# Patient Record
Sex: FEMALE | Race: WHITE | NOT HISPANIC OR LATINO | Employment: UNEMPLOYED | URBAN - NONMETROPOLITAN AREA
[De-identification: names, ages, dates, MRNs, and addresses within clinical notes are randomized per-mention and may not be internally consistent; named-entity substitution may affect disease eponyms.]

---

## 2017-06-21 ENCOUNTER — OFFICE VISIT (OUTPATIENT)
Dept: OPHTHALMOLOGY | Facility: CLINIC | Age: 57
End: 2017-06-21

## 2017-06-21 DIAGNOSIS — H52.03 HYPERMETROPIA, BILATERAL: ICD-10-CM

## 2017-06-21 DIAGNOSIS — H40.003 BORDERLINE GLAUCOMA, BILATERAL: Primary | ICD-10-CM

## 2017-06-21 PROCEDURE — 99214 OFFICE O/P EST MOD 30 MIN: CPT | Performed by: OPHTHALMOLOGY

## 2017-06-21 PROCEDURE — 76514 ECHO EXAM OF EYE THICKNESS: CPT | Performed by: OPHTHALMOLOGY

## 2017-06-21 PROCEDURE — 92133 CPTRZD OPH DX IMG PST SGM ON: CPT | Performed by: OPHTHALMOLOGY

## 2017-06-21 RX ORDER — TOPIRAMATE 100 MG/1
100 TABLET, FILM COATED ORAL
COMMUNITY
Start: 2016-11-04 | End: 2017-11-05

## 2017-06-21 RX ORDER — NAPROXEN 500 MG/1
500 TABLET ORAL
COMMUNITY
Start: 2016-11-07 | End: 2017-11-08

## 2017-06-21 RX ORDER — ALBUTEROL SULFATE 90 UG/1
2 AEROSOL, METERED RESPIRATORY (INHALATION) EVERY 6 HOURS
COMMUNITY
Start: 2016-11-07 | End: 2017-11-08

## 2017-06-21 NOTE — PROGRESS NOTES
Subjective   Carlota Rodriguez is a 56 y.o. female.   Chief Complaint   Patient presents with   • Eye Exam     HPI     Eye Exam   Laterality: both eyes   Quality: blurry vision   Severity: moderate           Comments   BV OU past 2 months, gradual; diff driving at night; FH glaucoma       Last edited by Ryan Howell MD on 6/21/2017 10:05 AM. (History)          Review of Systems    Objective   Base Eye Exam     Visual Acuity (Snellen - Linear)      Right Left   Dist cc 20/30 -2 20/30 -2   Near cc J3 J7       Correction:  Glasses      Tonometry (Applanation, 9:32 AM)      Right Left   Pressure 17 17         Pachymetry (6/21/2017)      Right Left   Thickness 578 555         Pupils      Pupils   Right PERRL   Left PERRL         Visual Fields      Left Right   Result Full Full         Extraocular Movement      Right Left   Result Full Full         Dilation     Both eyes:  2.5% Matias Synephrine, 1.0% Mydriacyl @ 9:40 AM            Slit Lamp and Fundus Exam     External Exam      Right Left    External Normal Normal      Slit Lamp Exam      Right Left    Lids/Lashes Normal Normal    Conjunctiva/Sclera White and quiet White and quiet    Cornea Clear Clear    Anterior Chamber Deep and quiet Deep and quiet    Iris Round and reactive Round and reactive    Lens Clear Clear    Vitreous Normal Normal      Fundus Exam      Right Left    Disc Tilted cup 0.15 ST,IT Normal 0.2 ST, 0.15 IT    Macula Normal Normal    Vessels Normal Normal    Periphery Normal Normal            Refraction     Wearing Rx      Sphere Cylinder Axis Add   Right +0.75 +0.50 135 +2.25   Left +0.25 +0.75 047 +2.25         Manifest Refraction      Sphere   Right ni   Left ni               OCT Disc:   OD- normal  OS- normal    Assessment/Plan   Diagnoses and all orders for this visit:    Borderline glaucoma, bilateral    Hypermetropia, bilateral      Plan:       Return in about 2 weeks (around 7/5/2017) for Visual Field 24-2, Refraction.

## 2018-03-28 ENCOUNTER — OFFICE VISIT (OUTPATIENT)
Dept: INTERNAL MEDICINE | Facility: CLINIC | Age: 58
End: 2018-03-28

## 2018-03-28 VITALS
BODY MASS INDEX: 35.33 KG/M2 | DIASTOLIC BLOOD PRESSURE: 80 MMHG | WEIGHT: 199.4 LBS | HEIGHT: 63 IN | SYSTOLIC BLOOD PRESSURE: 135 MMHG

## 2018-03-28 DIAGNOSIS — Z11.59 NEED FOR HEPATITIS C SCREENING TEST: ICD-10-CM

## 2018-03-28 DIAGNOSIS — G89.29 CHRONIC PAIN OF RIGHT ANKLE: ICD-10-CM

## 2018-03-28 DIAGNOSIS — M25.571 CHRONIC PAIN OF RIGHT ANKLE: ICD-10-CM

## 2018-03-28 DIAGNOSIS — E66.9 OBESITY, CLASS II, BMI 35-39.9: ICD-10-CM

## 2018-03-28 DIAGNOSIS — Z00.00 PHYSICAL EXAM, ROUTINE: Primary | ICD-10-CM

## 2018-03-28 PROCEDURE — 99203 OFFICE O/P NEW LOW 30 MIN: CPT | Performed by: INTERNAL MEDICINE

## 2018-03-28 RX ORDER — MELOXICAM 15 MG/1
15 TABLET ORAL DAILY
Qty: 30 TABLET | Refills: 2 | Status: SHIPPED | OUTPATIENT
Start: 2018-03-28 | End: 2018-07-15 | Stop reason: SINTOL

## 2018-03-28 NOTE — PROGRESS NOTES
Subjective   Carlota Rodriguez is a 57 y.o. female with PMH of obesity, mild intermittent asthma and chronic ankle pain who comes to establish care.    Patient is concerned about gradual weight gain. Has been on weight loss medications in the past with variable success.  She could not tolerate Contrave due to the side effects. Topamax did not help much. Took Phentermine, lost weight and then regained it back.      States that thyroid was boderline at some point and would like to have it rechecked. Also strong family history of DM.  Has never taken any thyroid replacement therapy.  No cold intolerance or fatigue but has been having problems with hair thinning  and dry skin.    Compalins of chronic right ankle pain. She fractured her ankle in 2002, had ankle surgery  In [place] and has chronic ankle pain since then, getting worse which impacts her quality of life. Her ankle has been swelling at times.  She was told that she needs ankle replacement and would like to explore this option again.          Past Medical History:   Diagnosis Date   • Allergic    • Anxiety    • Arthritis    • Asthma    • Depression    • Headache    • Heart murmur    • Hordeolum     KLAUDIA   • Low back pain    • Obesity    • Pneumonia    • Urinary tract infection    • Visual impairment      Past Surgical History:   Procedure Laterality Date   • BACK SURGERY     • COLONOSCOPY     • FOOT FRACTURE SURGERY      bilateral ankle surgery   • HYSTERECTOMY         Social History   Substance Use Topics   • Smoking status: Never Smoker   • Smokeless tobacco: Never Used   • Alcohol use No     Family History   Problem Relation Age of Onset   • Cataracts Mother    • Diabetes Mother    • Miscarriages / Stillbirths Mother    • Cataracts Father    • Stroke Father    • Anxiety disorder Sister    • Depression Sister    • Thyroid disease Sister    • Diabetes Brother    • Early death Brother    • Stroke Brother    • Cataracts Brother    • Cancer Maternal Aunt    •  Diabetes Maternal Aunt    • Diabetes Maternal Uncle    • Diabetes Paternal Aunt    • Diabetes Paternal Uncle    • Depression Paternal Grandmother    • Alcohol abuse Neg Hx      No Known Allergies  No current outpatient prescriptions on file prior to visit.     No current facility-administered medications on file prior to visit.      Review of Systems   Constitutional: Negative for activity change, chills and fever.   HENT: Negative for facial swelling and nosebleeds.    Respiratory: Negative for chest tightness and shortness of breath.    Cardiovascular: Negative for chest pain, palpitations and leg swelling.   Gastrointestinal: Negative for abdominal pain, constipation and diarrhea.   Endocrine: Negative for cold intolerance, heat intolerance, polydipsia and polyuria.   Genitourinary: Negative for flank pain and frequency.   Musculoskeletal:        Positive for right ankle pain    Neurological: Negative for dizziness, facial asymmetry and light-headedness.   Hematological: Negative for adenopathy. Does not bruise/bleed easily.   Psychiatric/Behavioral: Negative for sleep disturbance. The patient is not nervous/anxious.        Objective   Physical Exam   Constitutional: She is oriented to person, place, and time. She appears well-developed and well-nourished.   HENT:   Head: Normocephalic and atraumatic.   Nose: Nose normal.   Mouth/Throat: Oropharynx is clear and moist.   Eyes: Conjunctivae are normal. No scleral icterus.   Neck: Neck supple. No JVD present. No tracheal deviation present. No thyromegaly present.   Cardiovascular: Normal rate and regular rhythm.    Pulmonary/Chest: Effort normal and breath sounds normal.   Abdominal: Soft. Bowel sounds are normal. There is no tenderness. No hernia.   Musculoskeletal:        Right ankle: She exhibits decreased range of motion.   Right ankle - hypertrophic changes   Lymphadenopathy:     She has no cervical adenopathy.   Neurological: She is alert and oriented to  person, place, and time. She has normal reflexes. No cranial nerve deficit.   Skin: Skin is warm and dry. No rash noted.   Psychiatric: She has a normal mood and affect. Her behavior is normal. Judgment and thought content normal.   Nursing note and vitals reviewed.          There is no problem list on file for this patient.              Assessment/Plan   Carlota was seen today for establish care.    Diagnoses and all orders for this visit:    Physical exam, routine  -     CBC (No Diff)  -     Comprehensive metabolic panel  -     TSH  -     Lipid Panel  -     Hemoglobin A1c    Obesity, Class II, BMI 35-39.9  -     CBC (No Diff)  -     Comprehensive metabolic panel  -     TSH  -     Lipid Panel  -     Hemoglobin A1c    Chronic pain of right ankle  -     Ambulatory Referral to Orthopedic Surgery    Need for hepatitis C screening test  -     Hepatitis C Antibody    Other orders  -     Lorcaserin HCl (BELVIQ) 10 MG tablet; Take 1 tablet by mouth 2 (Two) Times a Day.  -     meloxicam (MOBIC) 15 MG tablet; Take 1 tablet by mouth Daily.         Plan    1. Patient is counseled on calories restricted diet.      Advised to increase physical activity as tolerated.     Trial of Lorcaserin      Medication side effects reviewed in detail.      Advised about habit nature of prescribed medication.      Jacques obtained and consistent.    2. Mobic 15 mg daily for ankle pain.      Referral to  in [facility] in [place].      Follow up in 1 month.        This document has been electronically signed by Janeth Mirza MD on March 28, 2018 2:31 PM

## 2018-03-28 NOTE — PATIENT INSTRUCTIONS
Calorie Counting for Weight Loss  Calories are units of energy. Your body needs a certain amount of calories from food to keep you going throughout the day. When you eat more calories than your body needs, your body stores the extra calories as fat. When you eat fewer calories than your body needs, your body burns fat to get the energy it needs.  Calorie counting means keeping track of how many calories you eat and drink each day. Calorie counting can be helpful if you need to lose weight. If you make sure to eat fewer calories than your body needs, you should lose weight. Ask your health care provider what a healthy weight is for you.  For calorie counting to work, you will need to eat the right number of calories in a day in order to lose a healthy amount of weight per week. A dietitian can help you determine how many calories you need in a day and will give you suggestions on how to reach your calorie goal.  · A healthy amount of weight to lose per week is usually 1-2 lb (0.5-0.9 kg). This usually means that your daily calorie intake should be reduced by 500-750 calories.  · Eating 1,200 - 1,500 calories per day can help most women lose weight.  · Eating 1,500 - 1,800 calories per day can help most men lose weight.  What is my plan?  My goal is to have __________ calories per day.  If I have this many calories per day, I should lose around __________ pounds per week.  What do I need to know about calorie counting?  In order to meet your daily calorie goal, you will need to:  · Find out how many calories are in each food you would like to eat. Try to do this before you eat.  · Decide how much of the food you plan to eat.  · Write down what you ate and how many calories it had. Doing this is called keeping a food log.  To successfully lose weight, it is important to balance calorie counting with a healthy lifestyle that includes regular activity. Aim for 150 minutes of moderate exercise (such as walking) or 75  minutes of vigorous exercise (such as running) each week.  Where do I find calorie information?     The number of calories in a food can be found on a Nutrition Facts label. If a food does not have a Nutrition Facts label, try to look up the calories online or ask your dietitian for help.  Remember that calories are listed per serving. If you choose to have more than one serving of a food, you will have to multiply the calories per serving by the amount of servings you plan to eat. For example, the label on a package of bread might say that a serving size is 1 slice and that there are 90 calories in a serving. If you eat 1 slice, you will have eaten 90 calories. If you eat 2 slices, you will have eaten 180 calories.  How do I keep a food log?  Immediately after each meal, record the following information in your food log:  · What you ate. Don't forget to include toppings, sauces, and other extras on the food.  · How much you ate. This can be measured in cups, ounces, or number of items.  · How many calories each food and drink had.  · The total number of calories in the meal.  Keep your food log near you, such as in a small notebook in your pocket, or use a mobile brett or website. Some programs will calculate calories for you and show you how many calories you have left for the day to meet your goal.  What are some calorie counting tips?  · Use your calories on foods and drinks that will fill you up and not leave you hungry:  ¨ Some examples of foods that fill you up are nuts and nut butters, vegetables, lean proteins, and high-fiber foods like whole grains. High-fiber foods are foods with more than 5 g fiber per serving.  ¨ Drinks such as sodas, specialty coffee drinks, alcohol, and juices have a lot of calories, yet do not fill you up.  · Eat nutritious foods and avoid empty calories. Empty calories are calories you get from foods or beverages that do not have many vitamins or protein, such as candy, sweets, and  "soda. It is better to have a nutritious high-calorie food (such as an avocado) than a food with few nutrients (such as a bag of chips).  · Know how many calories are in the foods you eat most often. This will help you calculate calorie counts faster.  · Pay attention to calories in drinks. Low-calorie drinks include water and unsweetened drinks.  · Pay attention to nutrition labels for \"low fat\" or \"fat free\" foods. These foods sometimes have the same amount of calories or more calories than the full fat versions. They also often have added sugar, starch, or salt, to make up for flavor that was removed with the fat.  · Find a way of tracking calories that works for you. Get creative. Try different apps or programs if writing down calories does not work for you.  What are some portion control tips?  · Know how many calories are in a serving. This will help you know how many servings of a certain food you can have.  · Use a measuring cup to measure serving sizes. You could also try weighing out portions on a kitchen scale. With time, you will be able to estimate serving sizes for some foods.  · Take some time to put servings of different foods on your favorite plates, bowls, and cups so you know what a serving looks like.  · Try not to eat straight from a bag or box. Doing this can lead to overeating. Put the amount you would like to eat in a cup or on a plate to make sure you are eating the right portion.  · Use smaller plates, glasses, and bowls to prevent overeating.  · Try not to multitask (for example, watch TV or use your computer) while eating. If it is time to eat, sit down at a table and enjoy your food. This will help you to know when you are full. It will also help you to be aware of what you are eating and how much you are eating.  What are tips for following this plan?  Reading food labels   · Check the calorie count compared to the serving size. The serving size may be smaller than what you are used to " eating.  · Check the source of the calories. Make sure the food you are eating is high in vitamins and protein and low in saturated and trans fats.  Shopping   · Read nutrition labels while you shop. This will help you make healthy decisions before you decide to purchase your food.  · Make a grocery list and stick to it.  Cooking   · Try to cook your favorite foods in a healthier way. For example, try baking instead of frying.  · Use low-fat dairy products.  Meal planning   · Use more fruits and vegetables. Half of your plate should be fruits and vegetables.  · Include lean proteins like poultry and fish.  How do I count calories when eating out?  · Ask for smaller portion sizes.  · Consider sharing an entree and sides instead of getting your own entree.  · If you get your own entree, eat only half. Ask for a box at the beginning of your meal and put the rest of your entree in it so you are not tempted to eat it.  · If calories are listed on the menu, choose the lower calorie options.  · Choose dishes that include vegetables, fruits, whole grains, low-fat dairy products, and lean protein.  · Choose items that are boiled, broiled, grilled, or steamed. Stay away from items that are buttered, battered, fried, or served with cream sauce. Items labeled “crispy” are usually fried, unless stated otherwise.  · Choose water, low-fat milk, unsweetened iced tea, or other drinks without added sugar. If you want an alcoholic beverage, choose a lower calorie option such as a glass of wine or light beer.  · Ask for dressings, sauces, and syrups on the side. These are usually high in calories, so you should limit the amount you eat.  · If you want a salad, choose a garden salad and ask for grilled meats. Avoid extra toppings like garcia, cheese, or fried items. Ask for the dressing on the side, or ask for olive oil and vinegar or lemon to use as dressing.  · Estimate how many servings of a food you are given. For example, a serving  of cooked rice is ½ cup or about the size of half a baseball. Knowing serving sizes will help you be aware of how much food you are eating at restaurants. The list below tells you how big or small some common portion sizes are based on everyday objects:  ¨ 1 oz--4 stacked dice.  ¨ 3 oz--1 deck of cards.  ¨ 1 tsp--1 die.  ¨ 1 Tbsp--½ a ping-pong ball.  ¨ 2 Tbsp--1 ping-pong ball.  ¨ ½ cup--½ baseball.  ¨ 1 cup--1 baseball.  Summary  · Calorie counting means keeping track of how many calories you eat and drink each day. If you eat fewer calories than your body needs, you should lose weight.  · A healthy amount of weight to lose per week is usually 1-2 lb (0.5-0.9 kg). This usually means reducing your daily calorie intake by 500-750 calories.  · The number of calories in a food can be found on a Nutrition Facts label. If a food does not have a Nutrition Facts label, try to look up the calories online or ask your dietitian for help.  · Use your calories on foods and drinks that will fill you up, and not on foods and drinks that will leave you hungry.  · Use smaller plates, glasses, and bowls to prevent overeating.  This information is not intended to replace advice given to you by your health care provider. Make sure you discuss any questions you have with your health care provider.  Document Released: 12/18/2006 Document Revised: 11/17/2017 Document Reviewed: 11/17/2017  [employer] Patient Education © 2017 [employer]  Exercising to Lose Weight  Exercising can help you to lose weight. In order to lose weight through exercise, you need to do vigorous-intensity exercise. You can tell that you are exercising with vigorous intensity if you are breathing very hard and fast and cannot hold a conversation while exercising.  Moderate-intensity exercise helps to maintain your current weight. You can tell that you are exercising at a moderate level if you have a higher heart rate and faster breathing, but you are  still able to hold a conversation.  How often should I exercise?  Choose an activity that you enjoy and set realistic goals. Your health care provider can help you to make an activity plan that works for you. Exercise regularly as directed by your health care provider. This may include:  · Doing resistance training twice each week, such as:  ¨ Push-ups.  ¨ Sit-ups.  ¨ Lifting weights.  ¨ Using resistance bands.  · Doing a given intensity of exercise for a given amount of time. Choose from these options:  ¨ 150 minutes of moderate-intensity exercise every week.  ¨ 75 minutes of vigorous-intensity exercise every week.  ¨ A mix of moderate-intensity and vigorous-intensity exercise every week.  Children, pregnant women, people who are out of shape, people who are overweight, and older adults may need to consult a health care provider for individual recommendations. If you have any sort of medical condition, be sure to consult your health care provider before starting a new exercise program.  What are some activities that can help me to lose weight?  · Walking at a rate of at least 4.5 miles an hour.  · Jogging or running at a rate of 5 miles per hour.  · Biking at a rate of at least 10 miles per hour.  · Lap swimming.  · Roller-skating or in-line skating.  · Cross-country skiing.  · Vigorous competitive sports, such as football, basketball, and soccer.  · Jumping rope.  · Aerobic dancing.  How can I be more active in my day-to-day activities?  · Use the stairs instead of the elevator.  · Take a walk during your lunch break.  · If you drive, park your car farther away from work or school.  · If you take public transportation, get off one stop early and walk the rest of the way.  · Make all of your phone calls while standing up and walking around.  · Get up, stretch, and walk around every 30 minutes throughout the day.  What guidelines should I follow while exercising?  · Do not exercise so much that you hurt yourself,  feel dizzy, or get very short of breath.  · Consult your health care provider prior to starting a new exercise program.  · Wear comfortable clothes and shoes with good support.  · Drink plenty of water while you exercise to prevent dehydration or heat stroke. Body water is lost during exercise and must be replaced.  · Work out until you breathe faster and your heart beats faster.  This information is not intended to replace advice given to you by your health care provider. Make sure you discuss any questions you have with your health care provider.  Document Released: 01/20/2012 Document Revised: 05/25/2017 Document Reviewed: 05/21/2015  [employer] Patient Education © 2017 [employer]

## 2018-03-29 ENCOUNTER — APPOINTMENT (OUTPATIENT)
Dept: LAB | Facility: HOSPITAL | Age: 58
End: 2018-03-29

## 2018-03-29 LAB
ALBUMIN SERPL-MCNC: 4.4 G/DL (ref 3.4–4.8)
ALBUMIN/GLOB SERPL: 1.1 G/DL (ref 1.1–1.8)
ALP SERPL-CCNC: 207 U/L (ref 38–126)
ALT SERPL W P-5'-P-CCNC: 32 U/L (ref 9–52)
ANION GAP SERPL CALCULATED.3IONS-SCNC: 15 MMOL/L (ref 5–15)
ARTICHOKE IGE QN: 67 MG/DL (ref 1–129)
AST SERPL-CCNC: 33 U/L (ref 14–36)
BILIRUB SERPL-MCNC: 0.5 MG/DL (ref 0.2–1.3)
BUN BLD-MCNC: 15 MG/DL (ref 7–21)
BUN/CREAT SERPL: 18.5 (ref 7–25)
CALCIUM SPEC-SCNC: 9.7 MG/DL (ref 8.4–10.2)
CHLORIDE SERPL-SCNC: 103 MMOL/L (ref 95–110)
CHOLEST SERPL-MCNC: 145 MG/DL (ref 0–199)
CO2 SERPL-SCNC: 25 MMOL/L (ref 22–31)
CREAT BLD-MCNC: 0.81 MG/DL (ref 0.5–1)
DEPRECATED RDW RBC AUTO: 39.4 FL (ref 36.4–46.3)
ERYTHROCYTE [DISTWIDTH] IN BLOOD BY AUTOMATED COUNT: 12 % (ref 11.5–14.5)
GFR SERPL CREATININE-BSD FRML MDRD: 73 ML/MIN/1.73 (ref 51–120)
GLOBULIN UR ELPH-MCNC: 3.9 GM/DL (ref 2.3–3.5)
GLUCOSE BLD-MCNC: 100 MG/DL (ref 60–100)
HBA1C MFR BLD: 5.6 % (ref 4–5.6)
HCT VFR BLD AUTO: 37.6 % (ref 35–45)
HDLC SERPL-MCNC: 49 MG/DL (ref 60–200)
HGB BLD-MCNC: 13 G/DL (ref 12–15.5)
LDLC/HDLC SERPL: 1.56 {RATIO} (ref 0–3.22)
MCH RBC QN AUTO: 31.3 PG (ref 26.5–34)
MCHC RBC AUTO-ENTMCNC: 34.6 G/DL (ref 31.4–36)
MCV RBC AUTO: 90.6 FL (ref 80–98)
PLATELET # BLD AUTO: 438 10*3/MM3 (ref 150–450)
PMV BLD AUTO: 10.2 FL (ref 8–12)
POTASSIUM BLD-SCNC: 4.7 MMOL/L (ref 3.5–5.1)
PROT SERPL-MCNC: 8.3 G/DL (ref 6.3–8.6)
RBC # BLD AUTO: 4.15 10*6/MM3 (ref 3.77–5.16)
SODIUM BLD-SCNC: 143 MMOL/L (ref 137–145)
TRIGL SERPL-MCNC: 98 MG/DL (ref 20–199)
TSH SERPL DL<=0.05 MIU/L-ACNC: 3.59 MIU/ML (ref 0.46–4.68)
WBC NRBC COR # BLD: 5.09 10*3/MM3 (ref 3.2–9.8)

## 2018-03-29 PROCEDURE — 86803 HEPATITIS C AB TEST: CPT | Performed by: INTERNAL MEDICINE

## 2018-03-29 PROCEDURE — 84443 ASSAY THYROID STIM HORMONE: CPT | Performed by: INTERNAL MEDICINE

## 2018-03-29 PROCEDURE — 85027 COMPLETE CBC AUTOMATED: CPT | Performed by: INTERNAL MEDICINE

## 2018-03-29 PROCEDURE — 80061 LIPID PANEL: CPT | Performed by: INTERNAL MEDICINE

## 2018-03-29 PROCEDURE — 36415 COLL VENOUS BLD VENIPUNCTURE: CPT | Performed by: INTERNAL MEDICINE

## 2018-03-29 PROCEDURE — 83036 HEMOGLOBIN GLYCOSYLATED A1C: CPT | Performed by: INTERNAL MEDICINE

## 2018-03-29 PROCEDURE — 80053 COMPREHEN METABOLIC PANEL: CPT | Performed by: INTERNAL MEDICINE

## 2018-03-30 ENCOUNTER — TELEPHONE (OUTPATIENT)
Dept: INTERNAL MEDICINE | Facility: CLINIC | Age: 58
End: 2018-03-30

## 2018-03-30 LAB — HCV AB SER DONR QL: NEGATIVE

## 2018-03-30 NOTE — TELEPHONE ENCOUNTER
SPOKE WITH PT LET HER KNOW LAB SHOW NOT DIABETIC ,CHOLESTEROL WAS OK ,THYROID FUNCTION OK ,ONE LIVER ENZYMES WAS ELEVATED BUT DR JENKINS SAID WILL RECHEC AGAIN WHEN SHE COMES BACK

## 2018-05-14 ENCOUNTER — OFFICE VISIT (OUTPATIENT)
Dept: INTERNAL MEDICINE | Facility: CLINIC | Age: 58
End: 2018-05-14

## 2018-05-14 VITALS
HEIGHT: 63 IN | BODY MASS INDEX: 33.65 KG/M2 | DIASTOLIC BLOOD PRESSURE: 80 MMHG | SYSTOLIC BLOOD PRESSURE: 130 MMHG | WEIGHT: 189.9 LBS

## 2018-05-14 DIAGNOSIS — R74.8 ABNORMAL LIVER ENZYMES: Primary | ICD-10-CM

## 2018-05-14 DIAGNOSIS — E66.9 OBESITY (BMI 30.0-34.9): ICD-10-CM

## 2018-05-14 DIAGNOSIS — K76.0 NAFLD (NONALCOHOLIC FATTY LIVER DISEASE): ICD-10-CM

## 2018-05-14 PROCEDURE — 99214 OFFICE O/P EST MOD 30 MIN: CPT | Performed by: INTERNAL MEDICINE

## 2018-05-18 ENCOUNTER — APPOINTMENT (OUTPATIENT)
Dept: ULTRASOUND IMAGING | Facility: HOSPITAL | Age: 58
End: 2018-05-18
Attending: INTERNAL MEDICINE

## 2018-05-29 ENCOUNTER — APPOINTMENT (OUTPATIENT)
Dept: LAB | Facility: HOSPITAL | Age: 58
End: 2018-05-29

## 2018-05-29 ENCOUNTER — HOSPITAL ENCOUNTER (OUTPATIENT)
Dept: ULTRASOUND IMAGING | Facility: HOSPITAL | Age: 58
Discharge: HOME | End: 2018-05-29
Attending: INTERNAL MEDICINE | Admitting: INTERNAL MEDICINE

## 2018-05-29 DIAGNOSIS — R79.89 ABNORMAL LIVER FUNCTION TEST: Primary | ICD-10-CM

## 2018-05-29 LAB
ALBUMIN SERPL-MCNC: 4 G/DL (ref 3.4–4.8)
ALP SERPL-CCNC: 169 U/L (ref 38–126)
ALT SERPL W P-5'-P-CCNC: 19 U/L (ref 9–52)
AST SERPL-CCNC: 24 U/L (ref 14–36)
BILIRUB CONJ SERPL-MCNC: 0 MG/DL (ref 0–0.3)
BILIRUB INDIRECT SERPL-MCNC: 0.2 MG/DL (ref 0–1.1)
BILIRUB SERPL-MCNC: 0.5 MG/DL (ref 0.2–1.3)
PROT SERPL-MCNC: 8 G/DL (ref 6.3–8.6)

## 2018-05-29 PROCEDURE — 80076 HEPATIC FUNCTION PANEL: CPT | Performed by: INTERNAL MEDICINE

## 2018-05-29 PROCEDURE — 76705 ECHO EXAM OF ABDOMEN: CPT

## 2018-05-29 PROCEDURE — 36415 COLL VENOUS BLD VENIPUNCTURE: CPT | Performed by: INTERNAL MEDICINE

## 2018-05-30 ENCOUNTER — TELEPHONE (OUTPATIENT)
Dept: INTERNAL MEDICINE | Facility: CLINIC | Age: 58
End: 2018-05-30

## 2018-05-30 NOTE — TELEPHONE ENCOUNTER
Spoke wit pt let her know lab shows alk phosphate still elevated but better .ultrasound shows some evidence of liver disease DR JENKINS  Has ordered adittional liver test she needs to go to lab

## 2018-05-31 ENCOUNTER — LAB (OUTPATIENT)
Dept: LAB | Facility: HOSPITAL | Age: 58
End: 2018-05-31

## 2018-05-31 ENCOUNTER — TELEPHONE (OUTPATIENT)
Dept: FAMILY MEDICINE CLINIC | Facility: CLINIC | Age: 58
End: 2018-05-31

## 2018-05-31 DIAGNOSIS — R79.89 ABNORMAL LIVER FUNCTION TEST: ICD-10-CM

## 2018-05-31 LAB — FERRITIN SERPL-MCNC: 121 NG/ML (ref 11.1–264)

## 2018-05-31 PROCEDURE — 82247 BILIRUBIN TOTAL: CPT

## 2018-05-31 PROCEDURE — 86704 HEP B CORE ANTIBODY TOTAL: CPT

## 2018-05-31 PROCEDURE — 83883 ASSAY NEPHELOMETRY NOT SPEC: CPT

## 2018-05-31 PROCEDURE — 36415 COLL VENOUS BLD VENIPUNCTURE: CPT

## 2018-05-31 PROCEDURE — 83010 ASSAY OF HAPTOGLOBIN QUANT: CPT

## 2018-05-31 PROCEDURE — 86706 HEP B SURFACE ANTIBODY: CPT

## 2018-05-31 PROCEDURE — 82947 ASSAY GLUCOSE BLOOD QUANT: CPT

## 2018-05-31 PROCEDURE — 84478 ASSAY OF TRIGLYCERIDES: CPT

## 2018-05-31 PROCEDURE — 83516 IMMUNOASSAY NONANTIBODY: CPT

## 2018-05-31 PROCEDURE — 82728 ASSAY OF FERRITIN: CPT

## 2018-05-31 PROCEDURE — 82172 ASSAY OF APOLIPOPROTEIN: CPT

## 2018-05-31 PROCEDURE — 82465 ASSAY BLD/SERUM CHOLESTEROL: CPT

## 2018-05-31 PROCEDURE — 87340 HEPATITIS B SURFACE AG IA: CPT

## 2018-05-31 PROCEDURE — 82103 ALPHA-1-ANTITRYPSIN TOTAL: CPT

## 2018-05-31 PROCEDURE — 84460 ALANINE AMINO (ALT) (SGPT): CPT

## 2018-05-31 PROCEDURE — 82977 ASSAY OF GGT: CPT

## 2018-05-31 PROCEDURE — 84450 TRANSFERASE (AST) (SGOT): CPT

## 2018-05-31 PROCEDURE — 82390 ASSAY OF CERULOPLASMIN: CPT

## 2018-05-31 NOTE — TELEPHONE ENCOUNTER
Pr Dr. Mirza, Ms. Rodriguez has been called with her recent US of the Liver additional results & recommendations.  Continue her current medications and follow-up as planned or sooner if any problems.      ----- Message from Janeth Mirza MD sent at 5/29/2018  5:59 PM CDT -----  See previous note  Also I forgot to mention kidney stone, not causing any blockage  ----- Message -----  From: [facility] Results [place]  Sent: 5/29/2018   1:31 PM  To: Janeth Mirza MD

## 2018-06-01 LAB
A1AT SERPL-MCNC: 167 MG/DL (ref 90–200)
ACTIN IGG SERPL-ACNC: 17 UNITS (ref 0–19)
CERULOPLASMIN SERPL-MCNC: 32.4 MG/DL (ref 19–39)
DEPRECATED MITOCHONDRIA M2 IGG SER-ACNC: <20 UNITS (ref 0–20)
HBV CORE AB SER DONR QL IA: NEGATIVE
HBV SURFACE AB SER QL: <5 (ref 0–4.99)
HBV SURFACE AB SER RIA-ACNC: ABNORMAL
HBV SURFACE AG SERPL QL IA: NEGATIVE

## 2018-06-02 LAB
A2 MACROGLOB SERPL-MCNC: 144 MG/DL (ref 110–276)
ALT SERPL W P-5'-P-CCNC: 9 IU/L (ref 0–40)
APO A-I SERPL-MCNC: 137 MG/DL (ref 116–209)
AST SERPL W P-5'-P-CCNC: 18 IU/L (ref 0–40)
BILIRUB SERPL-MCNC: 0.3 MG/DL (ref 0–1.2)
CHOLEST SERPL-MCNC: 143 MG/DL (ref 100–199)
FIBROSIS SCORING:: ABNORMAL
FIBROSIS STAGE SERPL QL: ABNORMAL
GGT SERPL-CCNC: 29 IU/L (ref 0–60)
GLUCOSE SERPL-MCNC: 98 MG/DL (ref 65–99)
HAPTOGLOB SERPL-MCNC: 327 MG/DL (ref 34–200)
INTERPRETATIONS: (REFERENCE): ABNORMAL
LABORATORY COMMENT REPORT: ABNORMAL
LIMITATIONS: (REFERENCE): ABNORMAL
LIVER FIBR SCORE SERPL CALC.FIBROSURE: 0.06 (ref 0–0.21)
NASH GRADE (REFERENCE): ABNORMAL
NASH SCORE (REFERENCE): 0.5
NASH SCORING (REFERENCE): ABNORMAL
STEATOSIS GRADE (REFERENCE): ABNORMAL
STEATOSIS GRADING (REFERENCE): ABNORMAL
STEATOSIS SCORE (REFERENCE): 0.59 (ref 0–0.3)
TRIGL SERPL-MCNC: 137 MG/DL (ref 0–149)
WEIGHT: (REFERENCE): 189 LBS

## 2018-06-05 ENCOUNTER — TELEPHONE (OUTPATIENT)
Dept: FAMILY MEDICINE CLINIC | Facility: CLINIC | Age: 58
End: 2018-06-05

## 2018-06-05 NOTE — PROGRESS NOTES
Pr Dr. Mirza, Ms. Rodriguez has been called with her recent lab results & recommendations.  Continue her current medications and follow-up as planned or sooner if any problems.

## 2018-06-05 NOTE — TELEPHONE ENCOUNTER
Pr Dr. Mirza, Ms. Rodriguez has been called with her recent lab results & recommendations.  Continue her current medications and follow-up as planned or sooner if any problems.      ----- Message from Janeth Mirza MD sent at 6/1/2018  5:13 PM CDT -----  Immune profile negative, also no hemochromatosis   [facility] is not back.  ----- Message -----  From: Lab, Background User  Sent: 5/31/2018  11:16 AM  To: Janeth Mirza MD

## 2018-07-13 ENCOUNTER — TELEPHONE (OUTPATIENT)
Dept: INTERNAL MEDICINE | Facility: CLINIC | Age: 58
End: 2018-07-13

## 2018-07-13 ENCOUNTER — OFFICE VISIT (OUTPATIENT)
Dept: INTERNAL MEDICINE | Facility: CLINIC | Age: 58
End: 2018-07-13

## 2018-07-13 ENCOUNTER — APPOINTMENT (OUTPATIENT)
Dept: LAB | Facility: HOSPITAL | Age: 58
End: 2018-07-13

## 2018-07-13 VITALS
DIASTOLIC BLOOD PRESSURE: 80 MMHG | BODY MASS INDEX: 32.39 KG/M2 | WEIGHT: 182.8 LBS | HEIGHT: 63 IN | SYSTOLIC BLOOD PRESSURE: 130 MMHG

## 2018-07-13 DIAGNOSIS — R07.89 CHEST PAIN, ATYPICAL: ICD-10-CM

## 2018-07-13 DIAGNOSIS — R06.02 SOB (SHORTNESS OF BREATH): Primary | ICD-10-CM

## 2018-07-13 DIAGNOSIS — E66.09 CLASS 1 OBESITY DUE TO EXCESS CALORIES WITH BODY MASS INDEX (BMI) OF 32.0 TO 32.9 IN ADULT, UNSPECIFIED WHETHER SERIOUS COMORBIDITY PRESENT: ICD-10-CM

## 2018-07-13 DIAGNOSIS — L98.9 SKIN LESION OF SCALP: ICD-10-CM

## 2018-07-13 DIAGNOSIS — Z79.899 HIGH RISK MEDICATION USE: ICD-10-CM

## 2018-07-13 LAB
ALBUMIN SERPL-MCNC: 4.4 G/DL (ref 3.4–4.8)
ALBUMIN/GLOB SERPL: 1 G/DL (ref 1.1–1.8)
ALP SERPL-CCNC: 164 U/L (ref 38–126)
ALT SERPL W P-5'-P-CCNC: 25 U/L (ref 9–52)
ANION GAP SERPL CALCULATED.3IONS-SCNC: 8 MMOL/L (ref 5–15)
AST SERPL-CCNC: 26 U/L (ref 14–36)
BASOPHILS # BLD AUTO: 0.04 10*3/MM3 (ref 0–0.2)
BASOPHILS NFR BLD AUTO: 0.6 % (ref 0–2)
BILIRUB SERPL-MCNC: 0.5 MG/DL (ref 0.2–1.3)
BUN BLD-MCNC: 13 MG/DL (ref 7–21)
BUN/CREAT SERPL: 16.9 (ref 7–25)
CALCIUM SPEC-SCNC: 9.4 MG/DL (ref 8.4–10.2)
CHLORIDE SERPL-SCNC: 101 MMOL/L (ref 95–110)
CO2 SERPL-SCNC: 30 MMOL/L (ref 22–31)
CREAT BLD-MCNC: 0.77 MG/DL (ref 0.5–1)
DEPRECATED RDW RBC AUTO: 42 FL (ref 36.4–46.3)
EOSINOPHIL # BLD AUTO: 0.26 10*3/MM3 (ref 0–0.7)
EOSINOPHIL NFR BLD AUTO: 3.7 % (ref 0–7)
ERYTHROCYTE [DISTWIDTH] IN BLOOD BY AUTOMATED COUNT: 12.4 % (ref 11.5–14.5)
GFR SERPL CREATININE-BSD FRML MDRD: 77 ML/MIN/1.73 (ref 51–120)
GLOBULIN UR ELPH-MCNC: 4.2 GM/DL (ref 2.3–3.5)
GLUCOSE BLD-MCNC: 123 MG/DL (ref 60–100)
HCT VFR BLD AUTO: 36.1 % (ref 35–45)
HGB BLD-MCNC: 12.3 G/DL (ref 12–15.5)
IMM GRANULOCYTES # BLD: 0.01 10*3/MM3 (ref 0–0.02)
IMM GRANULOCYTES NFR BLD: 0.1 % (ref 0–0.5)
LYMPHOCYTES # BLD AUTO: 1.93 10*3/MM3 (ref 0.6–4.2)
LYMPHOCYTES NFR BLD AUTO: 27.7 % (ref 10–50)
MCH RBC QN AUTO: 31.1 PG (ref 26.5–34)
MCHC RBC AUTO-ENTMCNC: 34.1 G/DL (ref 31.4–36)
MCV RBC AUTO: 91.2 FL (ref 80–98)
MONOCYTES # BLD AUTO: 0.61 10*3/MM3 (ref 0–0.9)
MONOCYTES NFR BLD AUTO: 8.7 % (ref 0–12)
NEUTROPHILS # BLD AUTO: 4.13 10*3/MM3 (ref 2–8.6)
NEUTROPHILS NFR BLD AUTO: 59.2 % (ref 37–80)
PLATELET # BLD AUTO: 432 10*3/MM3 (ref 150–450)
PMV BLD AUTO: 9.9 FL (ref 8–12)
POTASSIUM BLD-SCNC: 4.2 MMOL/L (ref 3.5–5.1)
PROT SERPL-MCNC: 8.6 G/DL (ref 6.3–8.6)
RBC # BLD AUTO: 3.96 10*6/MM3 (ref 3.77–5.16)
SODIUM BLD-SCNC: 139 MMOL/L (ref 137–145)
TSH SERPL DL<=0.05 MIU/L-ACNC: 2.7 MIU/ML (ref 0.46–4.68)
WBC NRBC COR # BLD: 6.98 10*3/MM3 (ref 3.2–9.8)

## 2018-07-13 PROCEDURE — 36415 COLL VENOUS BLD VENIPUNCTURE: CPT | Performed by: INTERNAL MEDICINE

## 2018-07-13 PROCEDURE — 99214 OFFICE O/P EST MOD 30 MIN: CPT | Performed by: INTERNAL MEDICINE

## 2018-07-13 PROCEDURE — 85025 COMPLETE CBC W/AUTO DIFF WBC: CPT | Performed by: INTERNAL MEDICINE

## 2018-07-13 PROCEDURE — 84443 ASSAY THYROID STIM HORMONE: CPT | Performed by: INTERNAL MEDICINE

## 2018-07-13 PROCEDURE — 80053 COMPREHEN METABOLIC PANEL: CPT | Performed by: INTERNAL MEDICINE

## 2018-07-13 PROCEDURE — 93005 ELECTROCARDIOGRAM TRACING: CPT | Performed by: INTERNAL MEDICINE

## 2018-07-13 PROCEDURE — 93010 ELECTROCARDIOGRAM REPORT: CPT | Performed by: INTERNAL MEDICINE

## 2018-07-13 NOTE — PROGRESS NOTES
"Subjective   Carlota Rodriguez is a 57 y.o. female       Patient presents with an episode of shortness of breath, chest tightness which woke her up from sleep couple of weeks ago.  She describes this as \"feeling like she had a heart attack\". Symptoms gradually resoved and she did not seek medical attention then.    Patient currently denies any chest pain, dyspnea, orthopnea or wheezing.  Denies any chest pain or chest tightness.  Her physical activity is limited by problems with her ankle for which she has pending appointment with ortho in [place].    Patient was able to lose more weight on Belviq.    Also has increasing\"cyst\" on the scalp and asking to be referred for removal as it has gotten larger and has been bothering her.     Past Medical History:   Diagnosis Date   • Allergic    • Anxiety    • Arthritis    • Asthma    • Depression    • Headache    • Heart murmur    • Hordeolum     KLAUDIA   • Low back pain    • Obesity    • Pneumonia    • Urinary tract infection    • Visual impairment      Past Surgical History:   Procedure Laterality Date   • BACK SURGERY     • COLONOSCOPY     • FOOT FRACTURE SURGERY      bilateral ankle surgery   • HYSTERECTOMY         Social History   Substance Use Topics   • Smoking status: Never Smoker   • Smokeless tobacco: Never Used   • Alcohol use No     Family History   Problem Relation Age of Onset   • Cataracts Mother    • Diabetes Mother    • Miscarriages / Stillbirths Mother    • Cataracts Father    • Stroke Father    • Anxiety disorder Sister    • Depression Sister    • Thyroid disease Sister    • Diabetes Brother    • Early death Brother    • Stroke Brother    • Cataracts Brother    • Cancer Maternal Aunt    • Diabetes Maternal Aunt    • Diabetes Maternal Uncle    • Diabetes Paternal Aunt    • Diabetes Paternal Uncle    • Depression Paternal Grandmother    • Alcohol abuse Neg Hx      No Known Allergies  Current Outpatient Prescriptions on File Prior to Visit   Medication Sig " Dispense Refill   • [DISCONTINUED] meloxicam (MOBIC) 15 MG tablet Take 1 tablet by mouth Daily. 30 tablet 2     No current facility-administered medications on file prior to visit.      Review of Systems   Constitutional: Negative.  Negative for activity change, chills and fever.   HENT: Negative for ear pain, rhinorrhea and sore throat.    Eyes: Negative.    Respiratory: Positive for shortness of breath. Negative for cough, chest tightness and wheezing.    Cardiovascular: Positive for chest pain. Negative for leg swelling.   Gastrointestinal: Negative for abdominal pain, nausea and vomiting.   Endocrine: Negative for cold intolerance and heat intolerance.   Musculoskeletal: Positive for gait problem. Negative for back pain and neck pain.   Skin: Negative.  Negative for rash.        Positive for scalp cyst     Neurological: Negative for dizziness, tremors, syncope, numbness and headaches.   Psychiatric/Behavioral: Negative for sleep disturbance. The patient is not nervous/anxious.        Objective   Physical Exam   Constitutional: She is oriented to person, place, and time. She appears well-developed and well-nourished.   HENT:   Head: Normocephalic and atraumatic.   Nose: Nose normal.   Mouth/Throat: Oropharynx is clear and moist.   Eyes: Conjunctivae are normal. Pupils are equal, round, and reactive to light. No scleral icterus.   Neck: Neck supple. No JVD present.   Cardiovascular: Normal rate and regular rhythm.    Pulmonary/Chest: Effort normal and breath sounds normal.   Abdominal: She exhibits no mass.   Musculoskeletal: She exhibits no deformity.   Neurological: She is alert and oriented to person, place, and time.   Skin: Skin is warm and dry. No rash noted.   Soft tissue mass palpated in the frontal area  No surrounding edema or erythema   Psychiatric: She has a normal mood and affect. Her behavior is normal.   Nursing note and vitals reviewed.          There is no problem list on file for this  patient.        XR Chest PA & Lateral [[id number]] (Order [id number])   Order   Status: Final result   Appointment Information     PACS Images     Radiology Images   Study Result           PROCEDURE: Chest PA and lateral     REASON FOR EXAM: sob, R06.02 Shortness of breath     FINDINGS: No comparison. . Cardiac and pulmonary vasculature are  normal . Right upper lung field small calcified lung parenchymal  granuloma consistent with old granulomatous disease. Lungs are  otherwise clear. Pleural spaces are normal . No acute osseous  abnormality.     IMPRESSION:  1.  Evidence of old granulomatous disease.  2.  No acute cardiopulmonary abnormality.     Electronically signed by:  Abrahan Alatorre MD  7/13/2018 10:38 AM CDT  Workstation: [id number]       ECG.NSR.No abnormailities.      Assessment/Plan   Carlota was seen today for shortness of breath and cyst.    Diagnoses and all orders for this visit:    SOB (shortness of breath)  -     ECG 12 Lead  -     XR Chest PA & Lateral  -     Ambulatory Referral to Cardiology  -     CBC & Differential  -     Comprehensive Metabolic Panel  -     TSH  -     CBC Auto Differential    Chest pain, atypical  -     ECG 12 Lead  -     XR Chest PA & Lateral  -     Ambulatory Referral to Cardiology  -     CBC & Differential  -     Comprehensive Metabolic Panel  -     TSH    Class 1 obesity due to excess calories with body mass index (BMI) of 32.0 to 32.9 in adult, unspecified whether serious comorbidity present    High risk medication use    Skin lesion of scalp  -     Ambulatory Referral to General Surgery    Other orders  -     ECG 12 Lead; Standing  -     ECG 12 Lead             Plan         1. Referral to cardiology for further evaluation.  Patient with strong family history of CAD.  2.Will hold Belviq for now.  Medication benefits and side effects discussed in detail.  Weight loss discussed. Calories restricted diet.  Activity: Approximately 150 minutes of moderate activity (such as walking program)   per week (20-30 minutes most days of the week)  Diet: Heart healthy foods - more fresh fruits and vegetables and whole grains; less red meat; more fish and poultry that is baked or grilled - not fried; less salt not to exceed 2000 mg daily - less processed food; No trans or saturated fat    Weight management: Patient's Body mass index is 32.39 kg/m². BMI is above normal parameters. Recommendations include: educational material, exercise counseling and nutrition counseling.    3. Referral to Dr Hayes for biopsy and excision if indicated.          Follow up in 4 weeks or earlier if clinically indicated.        This document has been electronically signed by Janeth Mirza MD on July 15, 2018 3:00 PM    Answers for HPI/ROS submitted by the patient on 7/11/2018   Shortness of breath  Chronicity: new  Onset: 1 to 4 weeks ago  Frequency: intermittently  Progression since onset: unchanged  Episode duration: 30 seconds  claudication: No  coryza: No  hemoptysis: No  leg pain: Yes  orthopnea: Yes  PND: Yes  sputum production: No  swollen glands: No  syncope: No  Aggravating factors: exercise, lying flat

## 2018-07-13 NOTE — PROGRESS NOTES
Subjective   Carlota Rodriguez is a 57 y.o. female  Past Medical History:   Diagnosis Date   • Allergic    • Anxiety    • Arthritis    • Asthma    • Depression    • Headache    • Heart murmur    • Hordeolum     KLAUDIA   • Low back pain    • Obesity    • Pneumonia    • Urinary tract infection    • Visual impairment      Past Surgical History:   Procedure Laterality Date   • BACK SURGERY     • COLONOSCOPY     • FOOT FRACTURE SURGERY      bilateral ankle surgery   • HYSTERECTOMY         Social History   Substance Use Topics   • Smoking status: Never Smoker   • Smokeless tobacco: Never Used   • Alcohol use No     Family History   Problem Relation Age of Onset   • Cataracts Mother    • Diabetes Mother    • Miscarriages / Stillbirths Mother    • Cataracts Father    • Stroke Father    • Anxiety disorder Sister    • Depression Sister    • Thyroid disease Sister    • Diabetes Brother    • Early death Brother    • Stroke Brother    • Cataracts Brother    • Cancer Maternal Aunt    • Diabetes Maternal Aunt    • Diabetes Maternal Uncle    • Diabetes Paternal Aunt    • Diabetes Paternal Uncle    • Depression Paternal Grandmother    • Alcohol abuse Neg Hx      No Known Allergies  Current Outpatient Prescriptions on File Prior to Visit   Medication Sig Dispense Refill   • Lorcaserin HCl (BELVIQ) 10 MG tablet Take 1 tablet by mouth 2 (Two) Times a Day. 60 tablet 1   • meloxicam (MOBIC) 15 MG tablet Take 1 tablet by mouth Daily. 30 tablet 2     No current facility-administered medications on file prior to visit.      Review of Systems    Objective   Physical Exam        There is no problem list on file for this patient.              Assessment/Plan   There are no diagnoses linked to this encounter.     There are no diagnoses linked to this encounter.    Plan           This document has been electronically signed by Janeth Mirza MD on July 13, 2018 9:27 AM    Answers for HPI/ROS submitted by the patient on 7/11/2018   Shortness of  breath  Chronicity: new  Onset: 1 to 4 weeks ago  Frequency: intermittently  Progression since onset: unchanged  Episode duration: 30 seconds  claudication: No  coryza: No  hemoptysis: No  leg pain: Yes  orthopnea: Yes  PND: Yes  sputum production: No  swollen glands: No  syncope: No  Aggravating factors: exercise, lying flat

## 2018-07-13 NOTE — PROGRESS NOTES
Answers for HPI/ROS submitted by the patient on 7/11/2018   Shortness of breath  Chronicity: new  Onset: 1 to 4 weeks ago  Frequency: intermittently  Progression since onset: unchanged  Episode duration: 30 seconds  abdominal pain: No  chest pain: No  claudication: No  coryza: No  ear pain: No  fever: No  headaches: No  hemoptysis: No  leg pain: Yes  leg swelling: No  neck pain: No  orthopnea: Yes  PND: Yes  rash: No  rhinorrhea: No  sore throat: No  sputum production: No  swollen glands: No  syncope: No  vomiting: No  wheezing: No  Aggravating factors: exercise, lying flat

## 2018-07-13 NOTE — TELEPHONE ENCOUNTER
SPOKE WITH PT LET HER KNOW APPT IS SCHEDULED FOR July 30TH WITH CHELE ZURITA AT 3:30 AT [facility]

## 2018-07-16 ENCOUNTER — TELEPHONE (OUTPATIENT)
Dept: FAMILY MEDICINE CLINIC | Facility: CLINIC | Age: 58
End: 2018-07-16

## 2018-07-16 NOTE — TELEPHONE ENCOUNTER
Pr Dr. Mirza, Ms. Rodriguez has been called with her recent lab results & recommendations.  Continue her current medications and follow-up as planned or sooner if any problems.    APPOINTMENT SCHEDULED FOR 1 MO 8/13/18 @ 8:15      ----- Message from Janeth Mirza MD sent at 7/15/2018 12:22 PM CDT -----  All labs ok, except of lft - stable  Chest x-ray ok  ----- Message -----  From: Lab, Background User  Sent: 7/13/2018   5:28 PM  To: Janeth Mirza MD

## 2018-08-06 ENCOUNTER — CONSULT (OUTPATIENT)
Dept: SURGERY | Facility: CLINIC | Age: 58
End: 2018-08-06

## 2018-08-06 VITALS
WEIGHT: 187.6 LBS | BODY MASS INDEX: 33.24 KG/M2 | SYSTOLIC BLOOD PRESSURE: 120 MMHG | HEIGHT: 63 IN | DIASTOLIC BLOOD PRESSURE: 60 MMHG

## 2018-08-06 DIAGNOSIS — L72.11 PILAR CYST: Primary | ICD-10-CM

## 2018-08-06 DIAGNOSIS — L72.3 SEBACEOUS CYST: Primary | ICD-10-CM

## 2018-08-06 PROCEDURE — 88304 TISSUE EXAM BY PATHOLOGIST: CPT | Performed by: SURGERY

## 2018-08-06 PROCEDURE — 11422 EXC H-F-NK-SP B9+MARG 1.1-2: CPT | Performed by: SURGERY

## 2018-08-06 PROCEDURE — 88304 TISSUE EXAM BY PATHOLOGIST: CPT | Performed by: PATHOLOGY

## 2018-08-06 NOTE — PROGRESS NOTES
Chief Complaint   Patient presents with   • Mass     Mass on scalp        HPI  Pilar cyst of the scalp.  Past Medical History:   Diagnosis Date   • Allergic    • Anxiety    • Arthritis    • Asthma    • Depression    • Headache    • Heart murmur    • Hordeolum     KLAUDIA   • Low back pain    • Obesity    • Pneumonia    • Urinary tract infection    • Visual impairment        Past Surgical History:   Procedure Laterality Date   • BACK SURGERY     • COLONOSCOPY     • FOOT FRACTURE SURGERY      bilateral ankle surgery   • HYSTERECTOMY         No current outpatient prescriptions on file.    No Known Allergies    Family History   Problem Relation Age of Onset   • Cataracts Mother    • Diabetes Mother    • Miscarriages / Stillbirths Mother    • Cataracts Father    • Stroke Father    • Anxiety disorder Sister    • Depression Sister    • Thyroid disease Sister    • Diabetes Brother    • Early death Brother    • Stroke Brother    • Cataracts Brother    • Cancer Maternal Aunt    • Diabetes Maternal Aunt    • Diabetes Maternal Uncle    • Diabetes Paternal Aunt    • Diabetes Paternal Uncle    • Depression Paternal Grandmother    • Alcohol abuse Neg Hx        Social History     Social History   • Marital status:      Spouse name: N/A   • Number of children: N/A   • Years of education: N/A     Occupational History   • Not on file.     Social History Main Topics   • Smoking status: Never Smoker   • Smokeless tobacco: Never Used   • Alcohol use No   • Drug use: No   • Sexual activity: Yes     Other Topics Concern   • Not on file     Social History Narrative   • No narrative on file       Review of Systems   Endocrine:        Hair loss   Musculoskeletal:        Arthritis       Physical Exam  1.1 cm pilar cyst of the scalp    Procedure    Pre-op dx: 1.1 cm pilar cyst  Post-op dx: Same  Procedure: Excision- 1.0 cm and simple closure  Surgeon: Devendra  Assistant: Cheadle  EBL: 2 cc  Findings: pilar cyst  Complications:  None  Drains:None  Anesthesia: 10 cc 1% xylocaine with epinephrine  Indications: See above  Informed consent; Risks of bleeding, infection, poor wound healing, open wounds, risk of local anesthesia are explained  Procedure: The patient is brought to the OR and place supine on the operating table. The area is prepped with betadine and locally anesthetized. An incision is made with a 15 blade knife and the lesion is excised. Wound is closed with interrrupted 4-0 nylon. Appropriate dressings are applied. Tolerated well.      ASSESSMENT    Carlota was seen today for mass.    Diagnoses and all orders for this visit:    Pilar cyst        PLAN    1. Recheck in 1 week for suture removal and path check          This document has been electronically signed by Kodak Ramsay MD on August 6, 2018 1:21 PM

## 2018-08-06 NOTE — PATIENT INSTRUCTIONS

## 2018-08-07 LAB
LAB AP CASE REPORT: NORMAL
PATH REPORT.FINAL DX SPEC: NORMAL
PATH REPORT.GROSS SPEC: NORMAL

## 2018-08-13 ENCOUNTER — OFFICE VISIT (OUTPATIENT)
Dept: SURGERY | Facility: CLINIC | Age: 58
End: 2018-08-13

## 2018-08-13 VITALS
DIASTOLIC BLOOD PRESSURE: 70 MMHG | BODY MASS INDEX: 33.42 KG/M2 | WEIGHT: 188.6 LBS | SYSTOLIC BLOOD PRESSURE: 120 MMHG | HEIGHT: 63 IN

## 2018-08-13 DIAGNOSIS — L72.11 PILAR CYST: Primary | ICD-10-CM

## 2018-08-13 PROCEDURE — 99024 POSTOP FOLLOW-UP VISIT: CPT | Performed by: SURGERY

## 2018-08-13 NOTE — PROGRESS NOTES
Chief Complaint   Patient presents with   • Follow-up     Recheck cyst on scalp        HPI  Doing well and complaints; suture removal    Tissue Pathology Exam   Order: [id number]   Status:  Final result   Visible to patient:  No (Not Released)   Dx:  Sebaceous cyst   Component 7d ago   Final Diagnosis   PILAR CYST, SCALP.   Electronically signed by Isidro Colunga MD on 8/7/2018 at 1431   Gross Description    The specimen consists of an irregularly-shaped piece of skin and subcutis from the scalp measuring 1.3 x 1.0 cm and cut to a depth of 0.5 cm.  Serially sectioned             Past Medical History:   Diagnosis Date   • Allergic    • Anxiety    • Arthritis    • Asthma    • Depression    • Headache    • Heart murmur    • Hordeolum     KLAUDIA   • Low back pain    • Obesity    • Pneumonia    • Urinary tract infection    • Visual impairment        Past Surgical History:   Procedure Laterality Date   • BACK SURGERY     • COLONOSCOPY     • FOOT FRACTURE SURGERY      bilateral ankle surgery   • HYSTERECTOMY         No current outpatient prescriptions on file.    No Known Allergies    Family History   Problem Relation Age of Onset   • Cataracts Mother    • Diabetes Mother    • Miscarriages / Stillbirths Mother    • Cataracts Father    • Stroke Father    • Anxiety disorder Sister    • Depression Sister    • Thyroid disease Sister    • Diabetes Brother    • Early death Brother    • Stroke Brother    • Cataracts Brother    • Cancer Maternal Aunt    • Diabetes Maternal Aunt    • Diabetes Maternal Uncle    • Diabetes Paternal Aunt    • Diabetes Paternal Uncle    • Depression Paternal Grandmother    • Alcohol abuse Neg Hx        Social History     Social History   • Marital status:      Spouse name: N/A   • Number of children: N/A   • Years of education: N/A     Occupational History   • Not on file.     Social History Main Topics   • Smoking status: Never Smoker   • Smokeless tobacco: Never Used   • Alcohol use No   •  Drug use: No   • Sexual activity: Yes     Other Topics Concern   • Not on file     Social History Narrative   • No narrative on file       Review of Systems  Nothing to add  Physical Exam    All incisions are healing nicely.  Sutures are removed.  IDALIA Van was seen today for follow-up.    Diagnoses and all orders for this visit:    Pilar cyst        PLAN    1.  Check with me as needed          This document has been electronically signed by Kodak Ramsay MD on August 13, 2018 1:16 PM

## 2018-08-13 NOTE — PATIENT INSTRUCTIONS

## 2018-08-27 ENCOUNTER — PROCEDURE VISIT (OUTPATIENT)
Dept: PULMONOLOGY | Facility: CLINIC | Age: 58
End: 2018-08-27

## 2018-08-27 ENCOUNTER — OFFICE VISIT (OUTPATIENT)
Dept: CARDIOLOGY | Facility: CLINIC | Age: 58
End: 2018-08-27

## 2018-08-27 ENCOUNTER — LAB (OUTPATIENT)
Dept: LAB | Facility: HOSPITAL | Age: 58
End: 2018-08-27

## 2018-08-27 VITALS
BODY MASS INDEX: 33.31 KG/M2 | SYSTOLIC BLOOD PRESSURE: 140 MMHG | HEIGHT: 63 IN | WEIGHT: 188 LBS | DIASTOLIC BLOOD PRESSURE: 80 MMHG | HEART RATE: 91 BPM | OXYGEN SATURATION: 97 %

## 2018-08-27 DIAGNOSIS — R06.02 SHORTNESS OF BREATH: ICD-10-CM

## 2018-08-27 DIAGNOSIS — R07.2 PRECORDIAL PAIN: Primary | ICD-10-CM

## 2018-08-27 DIAGNOSIS — R07.2 PRECORDIAL PAIN: ICD-10-CM

## 2018-08-27 PROBLEM — R06.00 DYSPNEA: Status: ACTIVE | Noted: 2018-08-27

## 2018-08-27 PROBLEM — R07.9 CHEST PAIN: Status: ACTIVE | Noted: 2018-08-27

## 2018-08-27 PROCEDURE — 94060 EVALUATION OF WHEEZING: CPT | Performed by: INTERNAL MEDICINE

## 2018-08-27 PROCEDURE — 94727 GAS DIL/WSHOT DETER LNG VOL: CPT | Performed by: INTERNAL MEDICINE

## 2018-08-27 PROCEDURE — 99214 OFFICE O/P EST MOD 30 MIN: CPT | Performed by: NURSE PRACTITIONER

## 2018-08-27 PROCEDURE — 36415 COLL VENOUS BLD VENIPUNCTURE: CPT

## 2018-08-27 PROCEDURE — 94729 DIFFUSING CAPACITY: CPT | Performed by: INTERNAL MEDICINE

## 2018-08-27 PROCEDURE — 83880 ASSAY OF NATRIURETIC PEPTIDE: CPT

## 2018-08-27 PROCEDURE — 94618 PULMONARY STRESS TESTING: CPT | Performed by: NURSE PRACTITIONER

## 2018-08-27 NOTE — PROGRESS NOTES
Carlota Rodriguez  Procedure: 6 Minute Walk Test   8/27/18  Indication:shortness of breath    Pretest: BP:148 80               HR:91               Sa02:97               Dyspnea:3               Fatigue:3    Post Test: BP:150/80               HR:118               Sa02:98               Dyspnea:3               Fatigue:3    First 6MWT:yes    Supplemental oxygen during test:no    Stopped before 6 minutes:no    Pauses:none    Results in distance walked:360.46 m    Did individual experience any pain or discomfort:yes chronic right ankle    Attestation:  I was immediately available for the above test and agree with the data.     NYHA Functional Class I.  Change in NYHA: change is not known          This document has been electronically signed by SABA Rodriguez on August 27, 2018 11:15 AM

## 2018-08-27 NOTE — PROGRESS NOTES
"Chest Pain and Shortness of Breath      Shortness of Breath   This is a new problem. The current episode started 1 to 4 weeks ago. The problem occurs intermittently. The problem has been unchanged. The average episode lasts 30 seconds. Associated symptoms include chest pain, orthopnea and PND. Pertinent negatives include no abdominal pain, claudication, fever, headaches, hemoptysis, leg pain, leg swelling, rash, sore throat or wheezing. The symptoms are aggravated by lying flat and any activity. Associated symptoms comments: \"feels like someone is sitting on chest. Cannot get any air\"  . The patient has no known risk factors for DVT/PE. She has tried nothing for the symptoms. The treatment provided no relief.   Chest Pain    This is a new problem. The current episode started more than 1 month ago. The onset quality is gradual. The problem occurs intermittently. The problem has been unchanged. The pain is present in the substernal region. The quality of the pain is described as pressure. The pain does not radiate. Associated symptoms include orthopnea, PND and shortness of breath. Pertinent negatives include no abdominal pain, claudication, cough, dizziness, fever, headaches, hemoptysis, irregular heartbeat, leg pain, palpitations or weakness.     Ms. Rodriguez presents to cardiology office today referred by Dr. Oro for sudden shortness of breath. It is described as \"for a few seconds, I feel like someone is sitting on my chest and my breath goes away\" She does complain of self described orthopnea in her ROS. However, she does not have SOA every time she lays down and does not sleep with pillows. This SOA feeling occurs randomly, but she does notice it at night and after activity. She has seasonal allergies, but they have been controlled this year. She does not have a lung history or asthma. Her CAD risk score is low- no DM, HTN, or HLD. No early onset CAD in her family. CXR was WNL with normal size heart. "     Cardiac Risk Factors:  The 10-year ASCVD risk score (Maria Fernanda MAGALLANES Jr., et al., 2013) is: 2.9%    Values used to calculate the score:      Age: 58 years      Sex: Female      Is Non- : No      Diabetic: No      Tobacco smoker: No      Systolic Blood Pressure: 140 mmHg      Is BP treated: No      HDL Cholesterol: 49 mg/dL      Total Cholesterol: 145 mg/dL      Past Medical History:   Diagnosis Date   • Allergic    • Anxiety    • Arthritis    • Asthma    • Depression    • Headache    • Heart murmur    • Hordeolum     KLAUDIA   • Low back pain    • Obesity    • Pneumonia    • Urinary tract infection    • Visual impairment      Family History   Problem Relation Age of Onset   • Diabetes Mother    • Stroke Father    • Heart disease Father    • Anxiety disorder Sister    • Depression Sister    • Thyroid disease Sister    • Diabetes Brother    • Early death Brother    • Stroke Brother    • Cancer Maternal Aunt    • Diabetes Maternal Aunt    • Diabetes Maternal Uncle    • Diabetes Paternal Aunt    • Diabetes Paternal Uncle    • Depression Paternal Grandmother    • Alcohol abuse Neg Hx        Past Surgical History:   Procedure Laterality Date   • BACK SURGERY     • COLONOSCOPY     • FOOT FRACTURE SURGERY      bilateral ankle surgery   • HYSTERECTOMY       Social History     Social History   • Marital status:      Social History Main Topics   • Smoking status: Never Smoker   • Smokeless tobacco: Never Used   • Alcohol use No   • Drug use: No   • Sexual activity: Yes     Other Topics Concern   • Not on file     ALLERGIES:  No Known Allergies      Review of Systems   Constitution: Negative for chills, decreased appetite, fever and weakness.   HENT: Negative.  Negative for sore throat.    Eyes: Negative.    Cardiovascular: Positive for chest pain, orthopnea and paroxysmal nocturnal dyspnea. Negative for claudication, dyspnea on exertion, irregular heartbeat, leg swelling and palpitations.   Respiratory:  "Positive for shortness of breath. Negative for cough, hemoptysis and wheezing.    Endocrine: Negative.    Skin: Negative for dry skin, flushing and rash.   Musculoskeletal: Negative for falls and myalgias.   Gastrointestinal: Negative for abdominal pain, change in bowel habit and melena.   Genitourinary: Negative for frequency and hematuria.   Neurological: Negative for dizziness, headaches, light-headedness and loss of balance.   Psychiatric/Behavioral: Negative for altered mental status and memory loss. The patient is not nervous/anxious.        No current outpatient prescriptions on file.     No current facility-administered medications for this visit.        OBJECTIVE:    Physical Exam:   Physical Exam   Constitutional: She is oriented to person, place, and time. She appears well-developed and well-nourished. No distress.   HENT:   Head: Normocephalic and atraumatic.   Neck: Normal range of motion. No JVD present.   Cardiovascular: Normal rate, regular rhythm, S1 normal, S2 normal, normal heart sounds and intact distal pulses.    No murmur heard.  Pulmonary/Chest: Effort normal and breath sounds normal. No respiratory distress. She has no wheezes. She has no rales.   Abdominal: Soft. Bowel sounds are normal.   Musculoskeletal: Normal range of motion. She exhibits no edema.   Neurological: She is alert and oriented to person, place, and time.   Skin: Skin is warm and dry. No erythema.   Psychiatric: She has a normal mood and affect. Her behavior is normal. Judgment and thought content normal.     Vitals:    08/27/18 1102 08/27/18 1103   BP: 148/80 140/80   BP Location: Left arm Right arm   Patient Position: Sitting Sitting   Cuff Size: Adult Adult   Pulse: 91    SpO2: 97%    Weight: 85.3 kg (188 lb)    Height: 160 cm (63\")        DATA REVIEWED:        No radiology results for the last 30 days.  CXR:    IMPRESSION:  1.  Evidence of old granulomatous disease.  2.  No acute cardiopulmonary " abnormality.     Electronically signed by:  Abrahan Alatorre MD  7/13/2018 10:38 AM CDT  Workstation: [id number]       Labs: BMP, CBC, LIPID, TSH  Lab Results   Component Value Date    GLUCOSE 123 (H) 07/13/2018    CALCIUM 9.4 07/13/2018     07/13/2018    K 4.2 07/13/2018    CO2 30.0 07/13/2018     07/13/2018    BUN 13 07/13/2018    CREATININE 0.77 07/13/2018    EGFRIFNONA 77 07/13/2018    BCR 16.9 07/13/2018    ANIONGAP 8.0 07/13/2018     Lab Results   Component Value Date    WBC 6.98 07/13/2018    HGB 12.3 07/13/2018    HCT 36.1 07/13/2018    MCV 91.2 07/13/2018     07/13/2018     Lab Results   Component Value Date    CHOL 145 03/29/2018     Lab Results   Component Value Date    TRIG 137 05/31/2018    TRIG 98 03/29/2018     Lab Results   Component Value Date    HDL 49 (L) 03/29/2018     No components found for: LDLCALC  Lab Results   Component Value Date    LDL 67 03/29/2018     No results found for: HDLLDLRATIO  No components found for: CHOLHDL  Lab Results   Component Value Date    TSH 2.700 07/13/2018     No results found for: PROBNP  EKG:     TTE:     Stress tests:     LHC:      The following portions of the patient's history were reviewed and updated as appropriate: allergies, current medications, past family history, past medical history, past social history, past surgical history and problem list.  Old records reviewed and pertinent information is included in the above objective data.     ASSESSMENT/PLAN:       Diagnosis Plan   1. Precordial pain  Chest pain syndrome. Pain characteristic: atypical angina   Patient is Low Risk.   Ischemic evaluation:requested.  The patient is able to exercise, possibly. Has a prior ankle injury that may limit capacity  EKG is Normal.    Risks/Benefits discussed    Ischemic evaluation with exercise cardiolyte    TTE, BNP    CXR reviewed. Labs reviewed.        2. Shortness of breath  Dyspnea. Etiology is likely pulmonary. The sudden, short loss of breath without  change in ADLs is unlikely CAD or CHF.  Low risk CAD.    There is not HF on examination. The patient does not have risk factors for HF. Risk factors for VTE: none known. The patient has not had LVEF and RVEF studied.     PFTs, 6MWT (440 meters 8/27/18)  TTE  Ischemic eval    Dr. Rubio referral for further work up.         Follow up in 1 month.

## 2018-09-17 ENCOUNTER — HOSPITAL ENCOUNTER (OUTPATIENT)
Dept: NUCLEAR MEDICINE | Facility: HOSPITAL | Age: 58
Discharge: HOME | End: 2018-09-17

## 2018-09-17 ENCOUNTER — HOSPITAL ENCOUNTER (OUTPATIENT)
Dept: CARDIOLOGY | Facility: HOSPITAL | Age: 58
Discharge: HOME | End: 2018-09-17

## 2018-09-17 DIAGNOSIS — R07.2 PRECORDIAL PAIN: ICD-10-CM

## 2018-09-17 DIAGNOSIS — R06.02 SHORTNESS OF BREATH: ICD-10-CM

## 2018-09-17 LAB
BH CV STRESS BP STAGE 1: NORMAL
BH CV STRESS DURATION MIN STAGE 1: 3
BH CV STRESS DURATION SEC STAGE 1: 0
BH CV STRESS DURATION SEC STAGE 2: 23
BH CV STRESS GRADE STAGE 1: 10
BH CV STRESS GRADE STAGE 2: 12
BH CV STRESS HR STAGE 1: 157
BH CV STRESS HR STAGE 2: 166
BH CV STRESS METS STAGE 1: 5
BH CV STRESS METS STAGE 2: 7.5
BH CV STRESS PROTOCOL 1: NORMAL
BH CV STRESS RECOVERY BP: NORMAL MMHG
BH CV STRESS RECOVERY HR: 84 BPM
BH CV STRESS SPEED STAGE 1: 1.7
BH CV STRESS SPEED STAGE 2: 2.5
BH CV STRESS STAGE 1: 1
BH CV STRESS STAGE 2: 2
LV EF NUC BP: 72 %
MAXIMAL PREDICTED HEART RATE: 161 BPM
PERCENT MAX PREDICTED HR: 103.11 %
STRESS BASELINE BP: NORMAL MMHG
STRESS BASELINE HR: 70 BPM
STRESS PERCENT HR: 121 %
STRESS POST ESTIMATED WORKLOAD: 5 METS
STRESS POST EXERCISE DUR MIN: 3 MIN
STRESS POST EXERCISE DUR SEC: 22 SEC
STRESS POST PEAK BP: NORMAL MMHG
STRESS POST PEAK HR: 166 BPM
STRESS TARGET HR: 137 BPM

## 2018-09-17 PROCEDURE — 93016 CV STRESS TEST SUPVJ ONLY: CPT | Performed by: INTERNAL MEDICINE

## 2018-09-17 PROCEDURE — 93018 CV STRESS TEST I&R ONLY: CPT | Performed by: INTERNAL MEDICINE

## 2018-09-17 PROCEDURE — 0 TECHNETIUM SESTAMIBI: Performed by: NURSE PRACTITIONER

## 2018-09-17 PROCEDURE — A9500 TC99M SESTAMIBI: HCPCS | Performed by: NURSE PRACTITIONER

## 2018-09-17 PROCEDURE — 93017 CV STRESS TEST TRACING ONLY: CPT

## 2018-09-17 PROCEDURE — 78452 HT MUSCLE IMAGE SPECT MULT: CPT

## 2018-09-17 PROCEDURE — 78452 HT MUSCLE IMAGE SPECT MULT: CPT | Performed by: INTERNAL MEDICINE

## 2018-09-17 RX ADMIN — TECHNETIUM TC 99M SESTAMIBI 1 DOSE: 1 INJECTION INTRAVENOUS at 08:48

## 2018-09-17 RX ADMIN — TECHNETIUM TC 99M SESTAMIBI 1 DOSE: 1 INJECTION INTRAVENOUS at 07:41

## 2018-09-24 ENCOUNTER — OFFICE VISIT (OUTPATIENT)
Dept: CARDIOLOGY | Facility: CLINIC | Age: 58
End: 2018-09-24

## 2018-09-24 VITALS
DIASTOLIC BLOOD PRESSURE: 78 MMHG | WEIGHT: 192.8 LBS | OXYGEN SATURATION: 98 % | HEART RATE: 76 BPM | BODY MASS INDEX: 34.16 KG/M2 | SYSTOLIC BLOOD PRESSURE: 168 MMHG | HEIGHT: 63 IN

## 2018-09-24 DIAGNOSIS — R06.02 SHORTNESS OF BREATH: ICD-10-CM

## 2018-09-24 DIAGNOSIS — R07.2 PRECORDIAL PAIN: Primary | ICD-10-CM

## 2018-09-24 DIAGNOSIS — I51.89 DIASTOLIC DYSFUNCTION: ICD-10-CM

## 2018-09-24 PROCEDURE — 99215 OFFICE O/P EST HI 40 MIN: CPT | Performed by: NURSE PRACTITIONER

## 2018-09-24 RX ORDER — LISINOPRIL AND HYDROCHLOROTHIAZIDE 12.5; 1 MG/1; MG/1
1 TABLET ORAL DAILY
Qty: 30 TABLET | Refills: 3 | Status: SHIPPED | OUTPATIENT
Start: 2018-09-24 | End: 2018-12-06 | Stop reason: SDUPTHER

## 2018-09-24 NOTE — PROGRESS NOTES
"precordial pain and Results (chief complaint)      Shortness of Breath   This is a new problem. The current episode started 1 to 4 weeks ago. The problem occurs intermittently. The problem has been unchanged. The average episode lasts 30 seconds. Associated symptoms include chest pain, orthopnea and PND. Pertinent negatives include no abdominal pain, claudication, coryza, ear pain, fever, headaches, hemoptysis, leg pain, leg swelling, neck pain, rash, rhinorrhea, sore throat, sputum production, swollen glands, syncope, vomiting or wheezing. The symptoms are aggravated by lying flat and any activity. Associated symptoms comments: \"feels like someone is sitting on chest. Cannot get any air\"  . The patient has no known risk factors for DVT/PE. She has tried nothing for the symptoms. The treatment provided no relief.   Chest Pain    This is a new problem. The current episode started more than 1 month ago. The onset quality is gradual. The problem occurs intermittently. The problem has been unchanged. The pain is present in the substernal region. The quality of the pain is described as pressure. The pain does not radiate. Associated symptoms include orthopnea, PND and shortness of breath. Pertinent negatives include no abdominal pain, claudication, cough, dizziness, fever, headaches, hemoptysis, irregular heartbeat, leg pain, palpitations, sputum production, syncope, vomiting or weakness.     Ms. Rodriguez presents to cardiology office today referred by Dr. Oro for sudden shortness of breath. It is described as \"for a few seconds, I feel like someone is sitting on my chest and my breath goes away\" She does complain of self described orthopnea in her ROS. However, she does not have SOA every time she lays down and does not sleep with pillows. This SOA feeling occurs randomly, but she does notice it at night and after activity. She has seasonal allergies, but they have been controlled this year. She does not have a lung " history or asthma. Her CAD risk score is low- no DM, HTN, or HLD. No early onset CAD in her family. CXR was WNL with normal size heart.     9/24/18: Returns for follow up test results. Dyspnea and chest tightness continue. We discussed the test results and the possibility of HTN causing restrictive heart disease. She was agreeable to beginning BP medications and RHC with Dr. Zurita. Anticipating Pulm appointment tomorrow to discuss PFTs in detail including ruling out lung etiology for dyspnea.     Cardiac Risk Factors:  The 10-year ASCVD risk score ([facility] MAGALLANES Jr., et al., 2013) is: 4.2%    Values used to calculate the score:      Age: 58 years      Sex: Female      Is Non- : No      Diabetic: No      Tobacco smoker: No      Systolic Blood Pressure: 168 mmHg      Is BP treated: No      HDL Cholesterol: 49 mg/dL      Total Cholesterol: 145 mg/dL      Past Medical History:   Diagnosis Date   • Allergic    • Anxiety    • Arthritis    • Asthma    • Depression    • Headache    • Heart murmur    • Hordeolum     KLAUDIA   • Low back pain    • Obesity    • Pneumonia    • Urinary tract infection    • Visual impairment      Family History   Problem Relation Age of Onset   • Diabetes Mother    • Stroke Father    • Heart disease Father    • Anxiety disorder Sister    • Depression Sister    • Thyroid disease Sister    • Diabetes Brother    • Early death Brother    • Stroke Brother    • Cancer Maternal Aunt    • Diabetes Maternal Aunt    • Diabetes Maternal Uncle    • Diabetes Paternal Aunt    • Diabetes Paternal Uncle    • Depression Paternal Grandmother    • Alcohol abuse Neg Hx        Past Surgical History:   Procedure Laterality Date   • BACK SURGERY     • COLONOSCOPY     • FOOT FRACTURE SURGERY      bilateral ankle surgery   • HYSTERECTOMY       Social History     Social History   • Marital status:      Social History Main Topics   • Smoking status: Never Smoker   • Smokeless tobacco: Never Used    • Alcohol use No   • Drug use: No   • Sexual activity: Yes     Other Topics Concern   • Not on file     ALLERGIES:  No Known Allergies      Review of Systems   Constitution: Negative for chills, decreased appetite, fever and weakness.   HENT: Negative.  Negative for ear pain, rhinorrhea and sore throat.    Eyes: Negative.    Cardiovascular: Positive for chest pain, orthopnea and paroxysmal nocturnal dyspnea. Negative for claudication, dyspnea on exertion, irregular heartbeat, leg swelling, palpitations and syncope.   Respiratory: Positive for shortness of breath. Negative for cough, hemoptysis, sputum production and wheezing.    Endocrine: Negative.    Skin: Negative for dry skin, flushing and rash.   Musculoskeletal: Negative for falls, myalgias and neck pain.   Gastrointestinal: Negative for abdominal pain, change in bowel habit, melena and vomiting.   Genitourinary: Negative for frequency and hematuria.   Neurological: Negative for dizziness, headaches, light-headedness and loss of balance.   Psychiatric/Behavioral: Negative for altered mental status and memory loss. The patient is not nervous/anxious.        Current Outpatient Prescriptions   Medication Sig Dispense Refill   • lisinopril-hydrochlorothiazide (PRINZIDE,ZESTORETIC) 10-12.5 MG per tablet Take 1 tablet by mouth Daily. 30 tablet 3     No current facility-administered medications for this visit.        OBJECTIVE:    Physical Exam:   Physical Exam   Constitutional: She is oriented to person, place, and time. She appears well-developed and well-nourished. No distress.   HENT:   Head: Normocephalic and atraumatic.   Neck: Normal range of motion. No JVD present.   Cardiovascular: Normal rate, regular rhythm, S1 normal, S2 normal, normal heart sounds and intact distal pulses.    No murmur heard.  Pulmonary/Chest: Effort normal and breath sounds normal. No respiratory distress. She has no wheezes. She has no rales.   Abdominal: Soft. Bowel sounds are  "normal.   Musculoskeletal: Normal range of motion. She exhibits no edema.   Neurological: She is alert and oriented to person, place, and time.   Skin: Skin is warm and dry. No erythema.   Psychiatric: She has a normal mood and affect. Her behavior is normal. Judgment and thought content normal.     Vitals:    09/24/18 0956   BP: 168/78   BP Location: Left arm   Patient Position: Sitting   Cuff Size: Adult   Pulse: 76   SpO2: 98%   Weight: 87.5 kg (192 lb 12.8 oz)   Height: 160 cm (63\")       DATA REVIEWED:   Results for orders placed during the hospital encounter of 08/27/18   Adult Transthoracic Echo Complete W/ Cont if Necessary Per Protocol    Narrative · Left ventricular systolic function is normal. Estimated EF = 63%.  · Left ventricular diastolic dysfunction (grade I) consistent with   impaired relaxation.  · Mild mitral valve regurgitation is present  · Mild tricuspid valve regurgitation is present.     RVSP(TR) 35.7 mmHg             Stress 9/17/18  Interpretation Summary     · Nuclear Study Description: A 1-day rest/stress protocol myocardial perfusion imaging study was performed  · Nuclear Perfusion Images: Overall image quality is excellent. GI artifact is present  · Study Impression: Myocardial perfusion imaging indicates a normal myocardial perfusion study with no evidence of ischemia  · Ventricle Size / Description: Left ventricular ejection fraction is hyperdynamic (Calculated EF > 70%).  · Impressions are consistent with a low risk study.       CXR:    IMPRESSION:  1.  Evidence of old granulomatous disease.  2.  No acute cardiopulmonary abnormality.     Electronically signed by:  Abrahan Alatorre MD  7/13/2018 10:38 AM CDT  Workstation: [id number]       Labs: BMP, CBC, LIPID, TSH  Lab Results   Component Value Date    GLUCOSE 123 (H) 07/13/2018    CALCIUM 9.4 07/13/2018     07/13/2018    K 4.2 07/13/2018    CO2 30.0 07/13/2018     07/13/2018    BUN 13 07/13/2018    CREATININE 0.77 07/13/2018    " EGFRIFNONA 77 07/13/2018    BCR 16.9 07/13/2018    ANIONGAP 8.0 07/13/2018     Lab Results   Component Value Date    WBC 6.98 07/13/2018    HGB 12.3 07/13/2018    HCT 36.1 07/13/2018    MCV 91.2 07/13/2018     07/13/2018     Lab Results   Component Value Date    CHOL 145 03/29/2018     Lab Results   Component Value Date    TRIG 137 05/31/2018    TRIG 98 03/29/2018     Lab Results   Component Value Date    HDL 49 (L) 03/29/2018     No components found for: LDLCALC  Lab Results   Component Value Date    LDL 67 03/29/2018     No results found for: HDLLDLRATIO  No components found for: CHOLHDL  Lab Results   Component Value Date    TSH 2.700 07/13/2018     Lab Results   Component Value Date    PROBNP 132.0 08/27/2018     EKG:     PFT        The following portions of the patient's history were reviewed and updated as appropriate: allergies, current medications, past family history, past medical history, past social history, past surgical history and problem list.  Old records reviewed and pertinent information is included in the above objective data.     ASSESSMENT/PLAN:       Diagnosis Plan   1. Precordial pain  Low risk nuclear stress test. Chest pain not likely to be cardiac.        2. Shortness of breath  Low risk CAD.    There is not HF on examination. The patient does not have risk factors for HF. Risk factors for VTE: none known. The patient has had LVEF and RVEF studied.     6MWT (440 meters 8/27/18)  PFTs completed- these showed restriction.   Prior CXR showed old granulomatous disease. She had pneumonia frequently as a child.   Echo was without evidence for her SOA  Ischemic eval was low risk  Suspicious for exercise induced severe diastolic dysfunction. Will add BP medication to reduce afterload.      Pulm appointment tomorrow.   RHC next week.       3. Mild Mitral Regurg in he presence of grade I DD   Ms. Rodriguez may benefit from a low dose BP medication. She had accelerated BP response to exercise  during stress test. She also experienced SOA after 2 minutes of exercise.     However, she could be experiencing worsening diastolic dysfunction during activity. Could benefit from Dr. Zurita evaluation and possible RHC.   Will schedule for RHC on 10/2/18.     The indications, risks and benefits of diagnostic right  heart cardiac catheterization, angiography, conscious sedation, and possible blood transfusion were discussed in detail with the patient. The increased risks that are present with pulmonary arterial hypertension with right heart catheterization were emphasized. I have cited a complication rate of 1.1% with total adverse events. This includes a 0.3% risk of inpatient admission due to a complication. I have cited a 0.5% risk of fatality. This includes the risk of PA perforation. I have cited a risk of hematoma, vagal reactions and pneumothorax as <1%. Pulmonary vasoreactivity testing, if indicated, can cause hypotension, bronchospasm, chest pain and SOB. Pulmonary angiography, if indicated, can rarely cause bronchospasm and one reported death has been cited due to intrapulmonary hemorrhage. I have also discussed the possible risks, though low, of heart block and the need for emergency venous pacing. Additionally, I went over that if a rare complication requires a thoracotomy or median sternotomy that this would be performed emergently by CTS. The patient is willing to proceed. ASA class is ASA 1 - Normal health patient; Mallampati is I (soft palate, uvula, fauces, and tonsillar pillars visible).  RHC, Venous access, possible VD challenge         Follow up 1 month.

## 2018-09-25 ENCOUNTER — OFFICE VISIT (OUTPATIENT)
Dept: PULMONOLOGY | Facility: CLINIC | Age: 58
End: 2018-09-25

## 2018-09-25 VITALS
SYSTOLIC BLOOD PRESSURE: 120 MMHG | DIASTOLIC BLOOD PRESSURE: 84 MMHG | BODY MASS INDEX: 33.93 KG/M2 | HEIGHT: 63 IN | OXYGEN SATURATION: 99 % | WEIGHT: 191.5 LBS | HEART RATE: 84 BPM

## 2018-09-25 DIAGNOSIS — J30.1 CHRONIC SEASONAL ALLERGIC RHINITIS DUE TO POLLEN: ICD-10-CM

## 2018-09-25 DIAGNOSIS — R06.02 SHORTNESS OF BREATH: Primary | ICD-10-CM

## 2018-09-25 DIAGNOSIS — E66.09 CLASS 1 OBESITY DUE TO EXCESS CALORIES WITHOUT SERIOUS COMORBIDITY WITH BODY MASS INDEX (BMI) OF 33.0 TO 33.9 IN ADULT: ICD-10-CM

## 2018-09-25 DIAGNOSIS — J45.20 MILD INTERMITTENT ASTHMA WITHOUT COMPLICATION: ICD-10-CM

## 2018-09-25 PROCEDURE — 99204 OFFICE O/P NEW MOD 45 MIN: CPT | Performed by: INTERNAL MEDICINE

## 2018-09-25 RX ORDER — FLUTICASONE PROPIONATE 50 MCG
2 SPRAY, SUSPENSION (ML) NASAL DAILY
Qty: 1 BOTTLE | Refills: 11 | Status: SHIPPED | OUTPATIENT
Start: 2018-09-25 | End: 2019-05-14

## 2018-09-25 RX ORDER — ALBUTEROL SULFATE 90 UG/1
2 AEROSOL, METERED RESPIRATORY (INHALATION) EVERY 4 HOURS PRN
Qty: 18 G | Refills: 11 | Status: SHIPPED | OUTPATIENT
Start: 2018-09-25 | End: 2019-05-14 | Stop reason: SDUPTHER

## 2018-09-25 NOTE — PROGRESS NOTES
Pulmonary Consultation    Sanaz Lopez APRN,    Thank you for asking me to see Carlota Rodriguez for   Chief Complaint   Patient presents with   • Shortness of Breath     REF SANAZ LOPEZ   .    Subjective     History of Present Illness  Carlota Rodriguez is a 58 y.o. female with a PMH significant for obesity, diastolic dysfunction, and HTN who presents for evaluation of dyspnea. Pt states she started having dyspnea where she felt like she could not get a breath at all a few weeks. She reports her dyspnea has improved but she still notices EMERY. Pt was seen by Sanaz Lopez and started on a new anti-hypertension. She has noticed new leg swelling around the time of her dyspnea, but it has now improved. Pt denies cough, wheeze, or chest pain. She has been trying to lose weight (better diet and increased activity), but her weight has increased ~10lbs over the past week. Pt denies increased dyspnea with the weight gain. She admits to seasonal allergies with nasal congestion, drainage, and sneezing. Pt denies issues with her allergies recently, but she will use an OTC allergy med.  She denies any prior diagnosis of lung disease, but does admit that she had frequent pneumonia as a child.  She is a never smoker and denies 2nd hand exposure. 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 There is no lung disease in the family.    Review of Systems: History obtained from chart review and the patient.  Review of Systems   Constitutional: Positive for unexpected weight change. Negative for fever.   HENT: Positive for congestion and postnasal drip. Negative for ear pain, rhinorrhea and sore throat.    Eyes: Positive for visual disturbance.   Respiratory: Positive for shortness of breath. Negative for cough, chest tightness and wheezing.    Cardiovascular: Positive for chest pain and leg swelling. Negative for palpitations.   Gastrointestinal: Negative for abdominal pain.   Musculoskeletal: Positive for  arthralgias. Negative for neck pain.   Skin: Negative for rash.   Neurological: Negative for headaches.     As described in the HPI. Otherwise, remainder of ROS (14 systems) were negative.    Patient Active Problem List   Diagnosis   • Pilar cyst   • Dyspnea   • Chest pain   • Shortness of breath   • Diastolic dysfunction         Current Outpatient Prescriptions:   •  lisinopril-hydrochlorothiazide (PRINZIDE,ZESTORETIC) 10-12.5 MG per tablet, Take 1 tablet by mouth Daily., Disp: 30 tablet, Rfl: 3  •  albuterol (PROVENTIL HFA;VENTOLIN HFA) 108 (90 Base) MCG/ACT inhaler, Inhale 2 puffs Every 4 (Four) Hours As Needed for Wheezing or Shortness of Air., Disp: 18 g, Rfl: 11  •  fluticasone (FLONASE) 50 MCG/ACT nasal spray, 2 sprays into the nostril(s) as directed by provider Daily., Disp: 1 bottle, Rfl: 11    Past Medical History:   Diagnosis Date   • Allergic    • Anxiety    • Arthritis    • Asthma    • Depression    • Headache    • Heart murmur    • Hordeolum     KLAUDIA   • Low back pain    • Obesity    • Pneumonia    • Urinary tract infection    • Visual impairment      Past Surgical History:   Procedure Laterality Date   • BACK SURGERY     • COLONOSCOPY     • FOOT FRACTURE SURGERY      bilateral ankle surgery   • HYSTERECTOMY       Social History     Social History   • Marital status:      Social History Main Topics   • Smoking status: Never Smoker   • Smokeless tobacco: Never Used   • Alcohol use No   • Drug use: No   • Sexual activity: Yes     Other Topics Concern   • Not on file     Family History   Problem Relation Age of Onset   • Diabetes Mother    • Stroke Father    • Heart disease Father    • Anxiety disorder Sister    • Depression Sister    • Thyroid disease Sister    • Diabetes Brother    • Early death Brother    • Stroke Brother    • Cancer Maternal Aunt    • Diabetes Maternal Aunt    • Diabetes Maternal Uncle    • Diabetes Paternal Aunt    • Diabetes Paternal Uncle    • Depression Paternal Grandmother   "  • Alcohol abuse Neg Hx           Objective     Blood pressure 120/84, pulse 84, height 160 cm (63\"), weight 86.9 kg (191 lb 8 oz), SpO2 99 %.  Physical Exam   Constitutional: She is oriented to person, place, and time. Vital signs are normal. She appears well-developed and well-nourished.   Obese   HENT:   Head: Normocephalic and atraumatic.   Nose: Mucosal edema present.   Mouth/Throat: Uvula is midline, oropharynx is clear and moist and mucous membranes are normal.   Mallampati 2   Eyes: Pupils are equal, round, and reactive to light. Conjunctivae, EOM and lids are normal.   Neck: Trachea normal and normal range of motion. No tracheal tenderness present. No thyroid mass present.   Cardiovascular: Normal rate, regular rhythm and normal heart sounds.  PMI is not displaced.  Exam reveals no gallop.    No murmur heard.  Pulmonary/Chest: Effort normal and breath sounds normal. No respiratory distress. She has no decreased breath sounds. She has no wheezes. She has no rhonchi. Chest wall is not dull to percussion. She exhibits no tenderness.   Abdominal: Soft. Normal appearance and bowel sounds are normal. There is no hepatomegaly. There is no tenderness.   Musculoskeletal:   Normal gait, no extremity edema     Vascular Status -  Her right foot exhibits no edema. Her left foot exhibits no edema.  Lymphadenopathy:        Head (right side): No submandibular adenopathy present.        Head (left side): No submandibular adenopathy present.     She has no cervical adenopathy.        Right: No supraclavicular adenopathy present.        Left: No supraclavicular adenopathy present.   Neurological: She is alert and oriented to person, place, and time.   Skin: Skin is warm and dry. No rash noted. No cyanosis. Nails show no clubbing.   Psychiatric: She has a normal mood and affect. Her speech is normal and behavior is normal. Judgment normal.   Nursing note and vitals reviewed.      PFTs: 8/27/18 (independently reviewed and " interpreted by me)  Ratio 88  FVC 2.56/ 79%  FEV1 2.26/ 90%  TLC 2.08/ 42%  DLCO 21.6/ 94%  Mild restriction (disagree with interpretation by Dr. ANABELLA Avalos).  No significant bronchodilator response.  Normal diffusing capacity.  No comparative data available.    Radiology (independently reviewed and interpreted by me): CXR 7/13/18 showed old granulomatous disease, NACPD       Assessment/Plan     Carlota was seen today for shortness of breath.    Diagnoses and all orders for this visit:    Shortness of breath    Mild intermittent asthma without complication  -     albuterol (PROVENTIL HFA;VENTOLIN HFA) 108 (90 Base) MCG/ACT inhaler; Inhale 2 puffs Every 4 (Four) Hours As Needed for Wheezing or Shortness of Air.    Chronic seasonal allergic rhinitis due to pollen  -     fluticasone (FLONASE) 50 MCG/ACT nasal spray; 2 sprays into the nostril(s) as directed by provider Daily.    Class 1 obesity due to excess calories without serious comorbidity with body mass index (BMI) of 33.0 to 33.9 in adult         Discussion/ Recommendations:   PFTs showed mild restriction but a normal diffusing capacity.  Chest x-ray was relatively unremarkable.  Given the episodic nature of the patient's symptoms as well as her history of allergies, I think she likely has developed a component of asthma.  She was noted to have diastolic dysfunction on recent echocardiogram and given new onset hypertension as well as edema, I agree with plans for right heart catheterization to assess for HFpEF and PH.  She has had some mild allergic rhinitis recently, but it is not bothersome enough at this time for her to except the medication.    -Start albuterol as needed for dyspnea or wheeze.  Counseled on proper technique.  Depending on response and frequency of use, we may escalate to an ICS.  -Use nasal saline at least twice daily.  If rhinitis symptoms persist, she should start using Flonase daily.  -Right heart catheterization as scheduled by   Parminder.  -Encouraged ongoing efforts at weight loss through diet and exercise.  -Recommended she get the flu vaccine soon.  -Encouraged her to establish with a new PCP for ongoing preventative care.    Patient's Body mass index is 33.92 kg/m². BMI is above normal parameters. Recommendations include: exercise counseling.           Return in about 6 weeks (around 11/6/2018) for Recheck asthma.      Thank you for allowing me to participate in the care of Carlota Rodriguez. Please do not hesitate to contact me with any questions.         This document has been electronically signed by Natasha Rubio MD on September 25, 2018 9:23 AM      Dictated using Dragon

## 2018-10-01 ENCOUNTER — TELEPHONE (OUTPATIENT)
Dept: CARDIOLOGY | Facility: CLINIC | Age: 58
End: 2018-10-01

## 2018-10-01 NOTE — TELEPHONE ENCOUNTER
Per dr. laguna mrs. vega should not have any issues with taking a bus trip after her heart cath on Tuesday. Ms. vega states she will keep the cath appt.

## 2018-10-02 ENCOUNTER — HOSPITAL ENCOUNTER (OUTPATIENT)
Facility: HOSPITAL | Age: 58
Setting detail: HOSPITAL OUTPATIENT SURGERY
Discharge: HOME | End: 2018-10-02
Attending: INTERNAL MEDICINE | Admitting: NURSE PRACTITIONER

## 2018-10-02 VITALS
OXYGEN SATURATION: 97 % | BODY MASS INDEX: 31.69 KG/M2 | WEIGHT: 185.63 LBS | HEIGHT: 64 IN | TEMPERATURE: 98.5 F | RESPIRATION RATE: 20 BRPM | SYSTOLIC BLOOD PRESSURE: 157 MMHG | HEART RATE: 84 BPM | DIASTOLIC BLOOD PRESSURE: 70 MMHG

## 2018-10-02 DIAGNOSIS — I51.89 DIASTOLIC DYSFUNCTION: ICD-10-CM

## 2018-10-02 DIAGNOSIS — R06.02 SHORTNESS OF BREATH: ICD-10-CM

## 2018-10-02 PROCEDURE — 93451 RIGHT HEART CATH: CPT | Performed by: INTERNAL MEDICINE

## 2018-10-02 PROCEDURE — C1894 INTRO/SHEATH, NON-LASER: HCPCS | Performed by: INTERNAL MEDICINE

## 2018-10-02 RX ORDER — SODIUM CHLORIDE 0.9 % (FLUSH) 0.9 %
3-10 SYRINGE (ML) INJECTION AS NEEDED
Status: DISCONTINUED | OUTPATIENT
Start: 2018-10-02 | End: 2018-10-02 | Stop reason: HOSPADM

## 2018-10-02 RX ORDER — SODIUM CHLORIDE 0.9 % (FLUSH) 0.9 %
3 SYRINGE (ML) INJECTION EVERY 12 HOURS SCHEDULED
Status: DISCONTINUED | OUTPATIENT
Start: 2018-10-02 | End: 2018-10-02 | Stop reason: HOSPADM

## 2018-10-02 RX ORDER — LIDOCAINE HYDROCHLORIDE 20 MG/ML
INJECTION, SOLUTION INFILTRATION; PERINEURAL AS NEEDED
Status: DISCONTINUED | OUTPATIENT
Start: 2018-10-02 | End: 2018-10-02 | Stop reason: HOSPADM

## 2018-10-02 NOTE — DISCHARGE INSTRUCTIONS
Pulmonary Artery Catheterization  Pulmonary artery catheterization is a procedure that is used to test blood movement through the heart and to monitor the heart's function. In this procedure, a thin, flexible tube (catheter) is passed into the right side of the heart and into the main artery that carries blood from your heart to your lungs (pulmonary artery). The procedure may be used to evaluate or help diagnose various problems, such as:  · Heart failure.  · Shock.  · Leaky heart valves (valvular regurgitation).  · Congenital heart disease.  · Burns.  · Kidney disease.  · High blood pressure within the arteries in the lungs (pulmonary hypertension).  · A buildup of fluid around the heart that prevents the heart from functioning normally (cardiac tamponade).  · A disease that causes the heart muscle to become rigid (restrictive cardiomyopathy).  · Abnormal blood flow between two areas of the heart (shunt).  After a heart attack, this procedure may be used to monitor for further problems and to see if medicines are working.  The procedure may be done in a cardiac catheterization lab or in an intensive care unit (ICU).  LET YOUR HEALTH CARE PROVIDER KNOW ABOUT:  · Any allergies you have.  · All medicines you are taking, including vitamins, herbs, eye drops, creams, and over-the-counter medicines.  · Previous problems you or members of your family have had with the use of anesthetics.  · Any blood disorders you have.  · Previous surgeries you have had.  · Any medical conditions you may have.  RISKS AND COMPLICATIONS  Generally, this is a safe procedure. However, problems may occur, including:  · Bruising or bleeding at the catheter insertion site.  · Injury to the vein where the catheter was inserted.  · Puncture to the lung. This is a risk if neck or chest veins are used.  The following problems may also occur, but they are very rare:  · Abnormal heart rhythms.  · Low blood pressure.  · Infection.  · Cardiac  tamponade.  · Blocked blood vessel caused by a blood clot or foreign material circulating in the blood (embolism). This can be caused by blood clots at the tip of the catheter.  BEFORE THE PROCEDURE  · Follow instructions from your health care provider about eating or drinking restrictions.  · Ask your health care provider about:    Changing or stopping your regular medicines. This is especially important if you are taking diabetes medicines or blood thinners.    Taking medicines such as aspirin and ibuprofen. These medicines can thin your blood. Do not take these medicines before your procedure if your health care provider instructs you not to.  PROCEDURE  · An IV tube will be inserted into one of your veins.  · You may be given a medicine that helps you relax (sedative).  · The area of your body that is chosen for insertion of the catheter will be cleaned. This is usually the neck or groin, but the insertion is sometimes done in another area.    You will be given a medicine that numbs this area (local anesthetic).    A small incision will be made in a vein in this area.  · A catheter will be inserted through the incision and into the vein. The health care provider will carefully move the catheter into the upper chamber of the heart (right atrium). X-rays may be used to help guide the catheter to the right place.  · The catheter will be threaded through two heart valves (tricuspid valve and pulmonary valve) and placed into the pulmonary artery.  · As soon as the catheter is in place, the blood pressure in the pulmonary artery will be measured.  · During the procedure, your heart's rhythm will be watched constantly using an electrocardiogram (ECG).  · The catheter will be removed after tests and monitoring have been completed.  The procedure may vary among health care providers and hospitals.  AFTER THE PROCEDURE  · CALL MD WITH ANY QUESTIONS CONCERNS OR WORSENING SYMPTOMS. IF OFFICE IS CLOSED CALL HOSPITAL   [phone]     This information is not intended to replace advice given to you by your health care provider. Make sure you discuss any questions you have with your health care provider.     Document Released: 04/22/2008 Document Revised: 05/03/2016 Document Reviewed: 12/22/2015  [employer] Interactive Patient Education ©2016 [employer]    Pulmonary Artery Catheterization  Pulmonary artery catheterization is a procedure that is used to test blood movement through the heart and to monitor the heart's function. In this procedure, a thin, flexible tube (catheter) is passed into the right side of the heart and into the main artery that carries blood from your heart to your lungs (pulmonary artery). The procedure may be used to evaluate or help diagnose various problems, such as:  · Heart failure.  · Shock.  · Leaky heart valves (valvular regurgitation).  · Congenital heart disease.  · Burns.  · Kidney disease.  · High blood pressure within the arteries in the lungs (pulmonary hypertension).  · A buildup of fluid around the heart that prevents the heart from functioning normally (cardiac tamponade).  · A disease that causes the heart muscle to become rigid (restrictive cardiomyopathy).  · Abnormal blood flow between two areas of the heart (shunt).  After a heart attack, this procedure may be used to monitor for further problems and to see if medicines are working.  The procedure may be done in a cardiac catheterization lab or in an intensive care unit (ICU).  LET YOUR HEALTH CARE PROVIDER KNOW ABOUT:  · Any allergies you have.  · All medicines you are taking, including vitamins, herbs, eye drops, creams, and over-the-counter medicines.  · Previous problems you or members of your family have had with the use of anesthetics.  · Any blood disorders you have.  · Previous surgeries you have had.  · Any medical conditions you may have.  RISKS AND COMPLICATIONS  Generally, this is a safe procedure. However, problems may  occur, including:  · Bruising or bleeding at the catheter insertion site.  · Injury to the vein where the catheter was inserted.  · Puncture to the lung. This is a risk if neck or chest veins are used.  The following problems may also occur, but they are very rare:  · Abnormal heart rhythms.  · Low blood pressure.  · Infection.  · Cardiac tamponade.  · Blocked blood vessel caused by a blood clot or foreign material circulating in the blood (embolism). This can be caused by blood clots at the tip of the catheter.  BEFORE THE PROCEDURE  · Follow instructions from your health care provider about eating or drinking restrictions.  · Ask your health care provider about:    Changing or stopping your regular medicines. This is especially important if you are taking diabetes medicines or blood thinners.    Taking medicines such as aspirin and ibuprofen. These medicines can thin your blood. Do not take these medicines before your procedure if your health care provider instructs you not to.  PROCEDURE  · An IV tube will be inserted into one of your veins.  · You may be given a medicine that helps you relax (sedative).  · The area of your body that is chosen for insertion of the catheter will be cleaned. This is usually the neck or groin, but the insertion is sometimes done in another area.    You will be given a medicine that numbs this area (local anesthetic).    A small incision will be made in a vein in this area.  · A catheter will be inserted through the incision and into the vein. The health care provider will carefully move the catheter into the upper chamber of the heart (right atrium). X-rays may be used to help guide the catheter to the right place.  · The catheter will be threaded through two heart valves (tricuspid valve and pulmonary valve) and placed into the pulmonary artery.  · As soon as the catheter is in place, the blood pressure in the pulmonary artery will be measured.  · During the procedure, your heart's  rhythm will be watched constantly using an electrocardiogram (ECG).  · The catheter will be removed after tests and monitoring have been completed.  The procedure may vary among health care providers and hospitals.  AFTER THE PROCEDURE  · CALL MD WITH ANY QUESTIONS CONCERNS OR WORSENING SYMPTOMS. IF OFFICE IS CLOSED CALL HOSPITAL  [phone]     This information is not intended to replace advice given to you by your health care provider. Make sure you discuss any questions you have with your health care provider.     Document Released: 04/22/2008 Document Revised: 05/03/2016 Document Reviewed: 12/22/2015  [employer] Interactive Patient Education ©2016 [employer]

## 2018-10-02 NOTE — H&P (VIEW-ONLY)
"precordial pain and Results (chief complaint)      Shortness of Breath   This is a new problem. The current episode started 1 to 4 weeks ago. The problem occurs intermittently. The problem has been unchanged. The average episode lasts 30 seconds. Associated symptoms include chest pain, orthopnea and PND. Pertinent negatives include no abdominal pain, claudication, coryza, ear pain, fever, headaches, hemoptysis, leg pain, leg swelling, neck pain, rash, rhinorrhea, sore throat, sputum production, swollen glands, syncope, vomiting or wheezing. The symptoms are aggravated by lying flat and any activity. Associated symptoms comments: \"feels like someone is sitting on chest. Cannot get any air\"  . The patient has no known risk factors for DVT/PE. She has tried nothing for the symptoms. The treatment provided no relief.   Chest Pain    This is a new problem. The current episode started more than 1 month ago. The onset quality is gradual. The problem occurs intermittently. The problem has been unchanged. The pain is present in the substernal region. The quality of the pain is described as pressure. The pain does not radiate. Associated symptoms include orthopnea, PND and shortness of breath. Pertinent negatives include no abdominal pain, claudication, cough, dizziness, fever, headaches, hemoptysis, irregular heartbeat, leg pain, palpitations, sputum production, syncope, vomiting or weakness.     Ms. Rodriguez presents to cardiology office today referred by Dr. Oro for sudden shortness of breath. It is described as \"for a few seconds, I feel like someone is sitting on my chest and my breath goes away\" She does complain of self described orthopnea in her ROS. However, she does not have SOA every time she lays down and does not sleep with pillows. This SOA feeling occurs randomly, but she does notice it at night and after activity. She has seasonal allergies, but they have been controlled this year. She does not have a lung " history or asthma. Her CAD risk score is low- no DM, HTN, or HLD. No early onset CAD in her family. CXR was WNL with normal size heart.     9/24/18: Returns for follow up test results. Dyspnea and chest tightness continue. We discussed the test results and the possibility of HTN causing restrictive heart disease. She was agreeable to beginning BP medications and RHC with Dr. Zurita. Anticipating Pulm appointment tomorrow to discuss PFTs in detail including ruling out lung etiology for dyspnea.     Cardiac Risk Factors:  The 10-year ASCVD risk score ([employer] MAGALLANES Jr., et al., 2013) is: 4.2%    Values used to calculate the score:      Age: 58 years      Sex: Female      Is Non- : No      Diabetic: No      Tobacco smoker: No      Systolic Blood Pressure: 168 mmHg      Is BP treated: No      HDL Cholesterol: 49 mg/dL      Total Cholesterol: 145 mg/dL      Past Medical History:   Diagnosis Date   • Allergic    • Anxiety    • Arthritis    • Asthma    • Depression    • Headache    • Heart murmur    • Hordeolum     KLAUDIA   • Low back pain    • Obesity    • Pneumonia    • Urinary tract infection    • Visual impairment      Family History   Problem Relation Age of Onset   • Diabetes Mother    • Stroke Father    • Heart disease Father    • Anxiety disorder Sister    • Depression Sister    • Thyroid disease Sister    • Diabetes Brother    • Early death Brother    • Stroke Brother    • Cancer Maternal Aunt    • Diabetes Maternal Aunt    • Diabetes Maternal Uncle    • Diabetes Paternal Aunt    • Diabetes Paternal Uncle    • Depression Paternal Grandmother    • Alcohol abuse Neg Hx        Past Surgical History:   Procedure Laterality Date   • BACK SURGERY     • COLONOSCOPY     • FOOT FRACTURE SURGERY      bilateral ankle surgery   • HYSTERECTOMY       Social History     Social History   • Marital status:      Social History Main Topics   • Smoking status: Never Smoker   • Smokeless tobacco: Never Used      • Alcohol use No   • Drug use: No   • Sexual activity: Yes     Other Topics Concern   • Not on file     ALLERGIES:  No Known Allergies      Review of Systems   Constitution: Negative for chills, decreased appetite, fever and weakness.   HENT: Negative.  Negative for ear pain, rhinorrhea and sore throat.    Eyes: Negative.    Cardiovascular: Positive for chest pain, orthopnea and paroxysmal nocturnal dyspnea. Negative for claudication, dyspnea on exertion, irregular heartbeat, leg swelling, palpitations and syncope.   Respiratory: Positive for shortness of breath. Negative for cough, hemoptysis, sputum production and wheezing.    Endocrine: Negative.    Skin: Negative for dry skin, flushing and rash.   Musculoskeletal: Negative for falls, myalgias and neck pain.   Gastrointestinal: Negative for abdominal pain, change in bowel habit, melena and vomiting.   Genitourinary: Negative for frequency and hematuria.   Neurological: Negative for dizziness, headaches, light-headedness and loss of balance.   Psychiatric/Behavioral: Negative for altered mental status and memory loss. The patient is not nervous/anxious.        Current Outpatient Prescriptions   Medication Sig Dispense Refill   • lisinopril-hydrochlorothiazide (PRINZIDE,ZESTORETIC) 10-12.5 MG per tablet Take 1 tablet by mouth Daily. 30 tablet 3     No current facility-administered medications for this visit.        OBJECTIVE:    Physical Exam:   Physical Exam   Constitutional: She is oriented to person, place, and time. She appears well-developed and well-nourished. No distress.   HENT:   Head: Normocephalic and atraumatic.   Neck: Normal range of motion. No JVD present.   Cardiovascular: Normal rate, regular rhythm, S1 normal, S2 normal, normal heart sounds and intact distal pulses.    No murmur heard.  Pulmonary/Chest: Effort normal and breath sounds normal. No respiratory distress. She has no wheezes. She has no rales.   Abdominal: Soft. Bowel sounds are  "normal.   Musculoskeletal: Normal range of motion. She exhibits no edema.   Neurological: She is alert and oriented to person, place, and time.   Skin: Skin is warm and dry. No erythema.   Psychiatric: She has a normal mood and affect. Her behavior is normal. Judgment and thought content normal.     Vitals:    09/24/18 0956   BP: 168/78   BP Location: Left arm   Patient Position: Sitting   Cuff Size: Adult   Pulse: 76   SpO2: 98%   Weight: 87.5 kg (192 lb 12.8 oz)   Height: 160 cm (63\")       DATA REVIEWED:   Results for orders placed during the hospital encounter of 08/27/18   Adult Transthoracic Echo Complete W/ Cont if Necessary Per Protocol    Narrative · Left ventricular systolic function is normal. Estimated EF = 63%.  · Left ventricular diastolic dysfunction (grade I) consistent with   impaired relaxation.  · Mild mitral valve regurgitation is present  · Mild tricuspid valve regurgitation is present.     RVSP(TR) 35.7 mmHg             Stress 9/17/18  Interpretation Summary     · Nuclear Study Description: A 1-day rest/stress protocol myocardial perfusion imaging study was performed  · Nuclear Perfusion Images: Overall image quality is excellent. GI artifact is present  · Study Impression: Myocardial perfusion imaging indicates a normal myocardial perfusion study with no evidence of ischemia  · Ventricle Size / Description: Left ventricular ejection fraction is hyperdynamic (Calculated EF > 70%).  · Impressions are consistent with a low risk study.       CXR:    IMPRESSION:  1.  Evidence of old granulomatous disease.  2.  No acute cardiopulmonary abnormality.     Electronically signed by:  Abrahan Alatorre MD  7/13/2018 10:38 AM CDT  Workstation: [id number]       Labs: BMP, CBC, LIPID, TSH  Lab Results   Component Value Date    GLUCOSE 123 (H) 07/13/2018    CALCIUM 9.4 07/13/2018     07/13/2018    K 4.2 07/13/2018    CO2 30.0 07/13/2018     07/13/2018    BUN 13 07/13/2018    CREATININE 0.77 07/13/2018    " EGFRIFNONA 77 07/13/2018    BCR 16.9 07/13/2018    ANIONGAP 8.0 07/13/2018     Lab Results   Component Value Date    WBC 6.98 07/13/2018    HGB 12.3 07/13/2018    HCT 36.1 07/13/2018    MCV 91.2 07/13/2018     07/13/2018     Lab Results   Component Value Date    CHOL 145 03/29/2018     Lab Results   Component Value Date    TRIG 137 05/31/2018    TRIG 98 03/29/2018     Lab Results   Component Value Date    HDL 49 (L) 03/29/2018     No components found for: LDLCALC  Lab Results   Component Value Date    LDL 67 03/29/2018     No results found for: HDLLDLRATIO  No components found for: CHOLHDL  Lab Results   Component Value Date    TSH 2.700 07/13/2018     Lab Results   Component Value Date    PROBNP 132.0 08/27/2018     EKG:     PFT        The following portions of the patient's history were reviewed and updated as appropriate: allergies, current medications, past family history, past medical history, past social history, past surgical history and problem list.  Old records reviewed and pertinent information is included in the above objective data.     ASSESSMENT/PLAN:       Diagnosis Plan   1. Precordial pain  Low risk nuclear stress test. Chest pain not likely to be cardiac.        2. Shortness of breath  Low risk CAD.    There is not HF on examination. The patient does not have risk factors for HF. Risk factors for VTE: none known. The patient has had LVEF and RVEF studied.     6MWT (440 meters 8/27/18)  PFTs completed- these showed restriction.   Prior CXR showed old granulomatous disease. She had pneumonia frequently as a child.   Echo was without evidence for her SOA  Ischemic eval was low risk  Suspicious for exercise induced severe diastolic dysfunction. Will add BP medication to reduce afterload.      Pulm appointment tomorrow.   RHC next week.       3. Mild Mitral Regurg in he presence of grade I DD   Ms. Rodriguez may benefit from a low dose BP medication. She had accelerated BP response to exercise  during stress test. She also experienced SOA after 2 minutes of exercise.     However, she could be experiencing worsening diastolic dysfunction during activity. Could benefit from Dr. Zurita evaluation and possible RHC.   Will schedule for RHC on 10/2/18.     The indications, risks and benefits of diagnostic right  heart cardiac catheterization, angiography, conscious sedation, and possible blood transfusion were discussed in detail with the patient. The increased risks that are present with pulmonary arterial hypertension with right heart catheterization were emphasized. I have cited a complication rate of 1.1% with total adverse events. This includes a 0.3% risk of inpatient admission due to a complication. I have cited a 0.5% risk of fatality. This includes the risk of PA perforation. I have cited a risk of hematoma, vagal reactions and pneumothorax as <1%. Pulmonary vasoreactivity testing, if indicated, can cause hypotension, bronchospasm, chest pain and SOB. Pulmonary angiography, if indicated, can rarely cause bronchospasm and one reported death has been cited due to intrapulmonary hemorrhage. I have also discussed the possible risks, though low, of heart block and the need for emergency venous pacing. Additionally, I went over that if a rare complication requires a thoracotomy or median sternotomy that this would be performed emergently by CTS. The patient is willing to proceed. ASA class is ASA 1 - Normal health patient; Mallampati is I (soft palate, uvula, fauces, and tonsillar pillars visible).  RHC, Venous access, possible VD challenge         Follow up 1 month.

## 2018-10-02 NOTE — INTERVAL H&P NOTE
H&P reviewed. The patient was examined and there are no changes to the H&P.      The indications, risks and benefits of diagnostic right  heart cardiac catheterization, angiography, conscious sedation, and possible blood transfusion were discussed in detail with the patient. The increased risks that are present with pulmonary arterial hypertension with right heart catheterization were emphasized. I have cited a complication rate of 1.1% with total adverse events. This includes a 0.3% risk of inpatient admission due to a complication. I have cited a 0.5% risk of fatality. This includes the risk of PA perforation. I have cited a risk of hematoma, vagal reactions and pneumothorax as <1%. Pulmonary vasoreactivity testing, if indicated, can cause hypotension, bronchospasm, chest pain and SOB. Pulmonary angiography, if indicated, can rarely cause bronchospasm and one reported death has been cited due to intrapulmonary hemorrhage. I have also discussed the possible risks, though low, of heart block and the need for emergency venous pacing. Additionally, I went over that if a rare complication requires a thoracotomy or median sternotomy that this would be performed emergently by CTS. The patient is willing to proceed. ASA class is ASA 3 - Patient with moderate systemic disease with functional limitations; Mallampati is II (hard and soft palate, upper portion of tonsils anduvula visible).  RHC, RUE access, possible VD challenge

## 2018-10-23 NOTE — PATIENT INSTRUCTIONS

## 2018-12-06 ENCOUNTER — LAB (OUTPATIENT)
Dept: LAB | Facility: HOSPITAL | Age: 58
End: 2018-12-06

## 2018-12-06 ENCOUNTER — OFFICE VISIT (OUTPATIENT)
Dept: FAMILY MEDICINE CLINIC | Facility: CLINIC | Age: 58
End: 2018-12-06

## 2018-12-06 VITALS
OXYGEN SATURATION: 98 % | HEIGHT: 64 IN | DIASTOLIC BLOOD PRESSURE: 70 MMHG | WEIGHT: 180 LBS | BODY MASS INDEX: 30.73 KG/M2 | SYSTOLIC BLOOD PRESSURE: 120 MMHG | HEART RATE: 99 BPM

## 2018-12-06 DIAGNOSIS — M15.9 OSTEOARTHRITIS OF MULTIPLE JOINTS, UNSPECIFIED OSTEOARTHRITIS TYPE: ICD-10-CM

## 2018-12-06 DIAGNOSIS — M79.9 LESION OF SOFT TISSUE OF LOWER LEG AND ANKLE: ICD-10-CM

## 2018-12-06 DIAGNOSIS — I51.89 DIASTOLIC DYSFUNCTION: ICD-10-CM

## 2018-12-06 DIAGNOSIS — Z76.89 ENCOUNTER TO ESTABLISH CARE WITH NEW DOCTOR: Primary | ICD-10-CM

## 2018-12-06 DIAGNOSIS — L65.9 ALOPECIA: ICD-10-CM

## 2018-12-06 LAB
FERRITIN SERPL-MCNC: 138 NG/ML (ref 11.1–264)
IRON 24H UR-MRATE: 66 MCG/DL (ref 37–170)
IRON SATN MFR SERPL: 24 % (ref 15–50)
T4 FREE SERPL-MCNC: 1.1 NG/DL (ref 0.78–2.19)
TIBC SERPL-MCNC: 279 MCG/DL (ref 265–497)
TSH SERPL DL<=0.05 MIU/L-ACNC: 3.61 MIU/ML (ref 0.46–4.68)

## 2018-12-06 PROCEDURE — 82626 DEHYDROEPIANDROSTERONE: CPT

## 2018-12-06 PROCEDURE — 83550 IRON BINDING TEST: CPT

## 2018-12-06 PROCEDURE — 84403 ASSAY OF TOTAL TESTOSTERONE: CPT

## 2018-12-06 PROCEDURE — 82728 ASSAY OF FERRITIN: CPT

## 2018-12-06 PROCEDURE — 83540 ASSAY OF IRON: CPT

## 2018-12-06 PROCEDURE — 99214 OFFICE O/P EST MOD 30 MIN: CPT | Performed by: FAMILY MEDICINE

## 2018-12-06 PROCEDURE — 84439 ASSAY OF FREE THYROXINE: CPT

## 2018-12-06 PROCEDURE — 84443 ASSAY THYROID STIM HORMONE: CPT

## 2018-12-06 PROCEDURE — 36415 COLL VENOUS BLD VENIPUNCTURE: CPT

## 2018-12-06 PROCEDURE — 84402 ASSAY OF FREE TESTOSTERONE: CPT

## 2018-12-06 RX ORDER — MELOXICAM 7.5 MG/1
7.5 TABLET ORAL DAILY
Qty: 30 TABLET | Refills: 2 | Status: SHIPPED | OUTPATIENT
Start: 2018-12-06 | End: 2019-05-14 | Stop reason: SDUPTHER

## 2018-12-06 RX ORDER — LISINOPRIL AND HYDROCHLOROTHIAZIDE 12.5; 1 MG/1; MG/1
1 TABLET ORAL DAILY
Qty: 30 TABLET | Refills: 3 | Status: SHIPPED | OUTPATIENT
Start: 2018-12-06 | End: 2019-05-14 | Stop reason: SDUPTHER

## 2018-12-07 RX ORDER — BACITRACIN, NEOMYCIN, POLYMYXIN B 400; 3.5; 5 [USP'U]/G; MG/G; [USP'U]/G
OINTMENT TOPICAL 2 TIMES DAILY
Qty: 1 TUBE | Refills: 0 | Status: SHIPPED | OUTPATIENT
Start: 2018-12-07 | End: 2019-05-14

## 2018-12-08 LAB
TESTOST FREE SERPL-MCNC: 1.6 PG/ML (ref 0–4.2)
TESTOST SERPL-MCNC: 23 NG/DL (ref 3–41)

## 2018-12-11 LAB — DHEA SERPL-MCNC: 29 NG/DL (ref 31–701)

## 2018-12-14 NOTE — PROGRESS NOTES
Subjective:     Chief Complaint:   Chief Complaint   Patient presents with   • Establish Care   • Hypertension       Carlota Rodriguez is a 58 y.o. female who presents for establish care visit. Patient was a previous patient of Dr. Mirza. Patient was recently seen by Cardiology for shortness of breath, found to have diastolic dysfunction, patient was started on a combination of Lisinopril-HCTZ for management. Patient complains of issues with hair loss today. She reports that in the past few weeks, she has noticed that she has thinning of her hair. She reports that at times, her hair tends to be falling out. She denies having these symptoms in the past. She denies any family history of apolecia or hair loss. She denies any pattern of hair loss in particular or any specific distribution of hair loss. She denies any pain or tenderness of her head. She is needing refills on her NSAID for osteoarthritis. She reports that at times, she does get aches and pain of her knees. She states that the anti-inflammatories do help with pain relief. She is able to function better when she takes a NSAID.     In addition, patient states that she recently had a procedure done on her right ankle where she had a skin lesion taken out. She states that she has some redness around the site. Denies any tenderness, or fluid drainage reported. She reports that she is able to bear weight without any issue.     Past Medical Hx:  Past Medical History:   Diagnosis Date   • Allergic    • Anxiety    • Arthritis    • Asthma    • Depression    • Headache    • Heart murmur    • Hordeolum     KLAUDIA   • Low back pain    • Obesity    • Pneumonia    • Urinary tract infection    • Visual impairment        Past Surgical Hx:  Past Surgical History:   Procedure Laterality Date   • BACK SURGERY     • COLONOSCOPY     • FOOT FRACTURE SURGERY      bilateral ankle surgery   • HYSTERECTOMY         Health Maintenance:  Health Maintenance   Topic Date Due   • ANNUAL  PHYSICAL  07/16/1962   • HEPATITIS A VACCINE ADULT (1 of 2) 07/16/1977   • ZOSTER VACCINE (2 of 3) 07/13/2019 (Originally 3/16/2017)   • MAMMOGRAM  09/19/2019   • COLONOSCOPY  06/12/2025   • TDAP/TD VACCINES (2 - Td) 06/05/2026   • HEPATITIS C SCREENING  Completed   • INFLUENZA VACCINE  Completed   • PAP SMEAR  Discontinued       Current Meds:    Current Outpatient Medications:   •  albuterol (PROVENTIL HFA;VENTOLIN HFA) 108 (90 Base) MCG/ACT inhaler, Inhale 2 puffs Every 4 (Four) Hours As Needed for Wheezing or Shortness of Air., Disp: 18 g, Rfl: 11  •  fluticasone (FLONASE) 50 MCG/ACT nasal spray, 2 sprays into the nostril(s) as directed by provider Daily., Disp: 1 bottle, Rfl: 11  •  lisinopril-hydrochlorothiazide (PRINZIDE,ZESTORETIC) 10-12.5 MG per tablet, Take 1 tablet by mouth Daily., Disp: 30 tablet, Rfl: 3  •  meloxicam (MOBIC) 7.5 MG tablet, Take 1 tablet by mouth Daily., Disp: 30 tablet, Rfl: 2  •  neomycin-bacitracin-polymyxin (NEOSPORIN) 400-5-5000 ointment, Apply  topically to the appropriate area as directed 2 (Two) Times a Day., Disp: 1 tube, Rfl: 0    Allergies:  Patient has no known allergies.    Family Hx:  Family History   Problem Relation Age of Onset   • Diabetes Mother    • Stroke Father    • Heart disease Father    • Anxiety disorder Sister    • Depression Sister    • Thyroid disease Sister    • Diabetes Brother    • Early death Brother    • Stroke Brother    • Cancer Maternal Aunt    • Diabetes Maternal Aunt    • Diabetes Maternal Uncle    • Diabetes Paternal Aunt    • Diabetes Paternal Uncle    • Depression Paternal Grandmother    • Alcohol abuse Neg Hx         Social History:  Social History     Socioeconomic History   • Marital status:      Spouse name: Not on file   • Number of children: Not on file   • Years of education: Not on file   • Highest education level: Not on file   Social Needs   • Financial resource strain: Not on file   • Food insecurity - worry: Not on file   • Food  "insecurity - inability: Not on file   • Transportation needs - medical: Not on file   • Transportation needs - non-medical: Not on file   Occupational History   • Not on file   Tobacco Use   • Smoking status: Never Smoker   • Smokeless tobacco: Never Used   Substance and Sexual Activity   • Alcohol use: No   • Drug use: No   • Sexual activity: Yes   Other Topics Concern   • Not on file   Social History Narrative   • Not on file       Review of Systems  Review of Systems   Constitutional: Negative for appetite change, chills and fever.   HENT: Negative for congestion, ear pain, rhinorrhea, sneezing and sore throat.    Respiratory: Negative for cough and shortness of breath.    Cardiovascular: Negative for chest pain, palpitations and leg swelling.   Gastrointestinal: Negative for diarrhea, nausea and vomiting.   Endocrine: Negative for polyphagia and polyuria.   Musculoskeletal: Positive for arthralgias. Negative for back pain and neck pain.   Skin: Negative for color change and rash.   Neurological: Negative for dizziness, syncope and weakness.   Psychiatric/Behavioral: Negative for agitation, confusion and dysphoric mood.       Objective:     /70 (BP Location: Left arm)   Pulse 99   Ht 162.6 cm (64.02\")   Wt 81.6 kg (180 lb) Comment: est  SpO2 98%   BMI 30.88 kg/m²     Physical Exam   Constitutional: She is oriented to person, place, and time. She appears well-developed and well-nourished.   Cardiovascular: Normal rate, regular rhythm and normal heart sounds. Exam reveals no gallop and no friction rub.   No murmur heard.  Pulmonary/Chest: Effort normal and breath sounds normal. No respiratory distress. She has no wheezes. She has no rales.   Abdominal: Soft. Bowel sounds are normal. There is no tenderness.   Musculoskeletal: Normal range of motion. She exhibits no edema.   Neurological: She is alert and oriented to person, place, and time.   Skin: Skin is warm and dry.   Skin lesion noted on right ankle, " with minimal surrounding erythema present.    Psychiatric: She has a normal mood and affect. Her behavior is normal.   Vitals reviewed.         Assessment/Plan:     Carlota was seen today for establish care and hypertension.    Diagnoses and all orders for this visit:    Encounter to establish care with new doctor    Alopecia  -     TSH; Future  -     T4, free; Future  -     Iron and TIBC; Future  -     Ferritin; Future  -     DHEA; Future  -     Testosterone, Free, Total; Future    Osteoarthritis of multiple joints, unspecified osteoarthritis type  -     meloxicam (MOBIC) 7.5 MG tablet; Take 1 tablet by mouth Daily.    Diastolic dysfunction  -     lisinopril-hydrochlorothiazide (PRINZIDE,ZESTORETIC) 10-12.5 MG per tablet; Take 1 tablet by mouth Daily.    Lesion of soft tissue of lower leg and ankle  -     neomycin-bacitracin-polymyxin (NEOSPORIN) 400-5-5000 ointment; Apply  topically to the appropriate area as directed 2 (Two) Times a Day.        Return in about 3 months (around 3/6/2019) for Next scheduled follow up.    GOALS:  Improve diet and exercise   BARRIERS TO GOALS:  N/A      Preventative:  Female Preventative: Exercises regularly  Last mammogram was 2017  Colon cancer screening is up to date  Cholesterol screening is up to date  up to date and documented   Recommended:Influenza  Tobacco: non smoker  Alcohol: does not drink  Diet/Exercise: eat more fruits and vegetables, decrease soda or juice intake, increase water intake, increase physical activity, reduce screen time, reduce portion size, cut out extra servings, reduce fast food intake, family to eat at dinner table more often, keep TV off during meals, plan meals, eat breakfast and have 3 meals a day    RISK SCORE: 4      This document has been electronically signed by Pedro Weeks MD on December 14, 2018 3:07 PM

## 2019-05-14 ENCOUNTER — OFFICE VISIT (OUTPATIENT)
Dept: FAMILY MEDICINE CLINIC | Facility: CLINIC | Age: 59
End: 2019-05-14

## 2019-05-14 ENCOUNTER — LAB (OUTPATIENT)
Dept: LAB | Facility: HOSPITAL | Age: 59
End: 2019-05-14

## 2019-05-14 VITALS
HEART RATE: 89 BPM | SYSTOLIC BLOOD PRESSURE: 102 MMHG | DIASTOLIC BLOOD PRESSURE: 70 MMHG | BODY MASS INDEX: 33.46 KG/M2 | WEIGHT: 196 LBS | HEIGHT: 64 IN | OXYGEN SATURATION: 98 %

## 2019-05-14 DIAGNOSIS — E66.9 OBESITY (BMI 30.0-34.9): ICD-10-CM

## 2019-05-14 DIAGNOSIS — E66.9 OBESITY (BMI 30.0-34.9): Primary | ICD-10-CM

## 2019-05-14 DIAGNOSIS — J45.20 MILD INTERMITTENT ASTHMA WITHOUT COMPLICATION: ICD-10-CM

## 2019-05-14 DIAGNOSIS — M15.9 OSTEOARTHRITIS OF MULTIPLE JOINTS, UNSPECIFIED OSTEOARTHRITIS TYPE: ICD-10-CM

## 2019-05-14 DIAGNOSIS — I51.89 DIASTOLIC DYSFUNCTION: ICD-10-CM

## 2019-05-14 PROCEDURE — 84439 ASSAY OF FREE THYROXINE: CPT

## 2019-05-14 PROCEDURE — 36415 COLL VENOUS BLD VENIPUNCTURE: CPT

## 2019-05-14 PROCEDURE — 84443 ASSAY THYROID STIM HORMONE: CPT

## 2019-05-14 PROCEDURE — 83525 ASSAY OF INSULIN: CPT

## 2019-05-14 PROCEDURE — 99396 PREV VISIT EST AGE 40-64: CPT | Performed by: FAMILY MEDICINE

## 2019-05-14 RX ORDER — MELOXICAM 7.5 MG/1
7.5 TABLET ORAL DAILY
Qty: 30 TABLET | Refills: 2 | Status: SHIPPED | OUTPATIENT
Start: 2019-05-14 | End: 2019-10-08

## 2019-05-14 RX ORDER — LISINOPRIL AND HYDROCHLOROTHIAZIDE 12.5; 1 MG/1; MG/1
1 TABLET ORAL DAILY
Qty: 30 TABLET | Refills: 3 | Status: SHIPPED | OUTPATIENT
Start: 2019-05-14 | End: 2019-10-03 | Stop reason: SDUPTHER

## 2019-05-14 RX ORDER — ALBUTEROL SULFATE 90 UG/1
2 AEROSOL, METERED RESPIRATORY (INHALATION) EVERY 4 HOURS PRN
Qty: 18 G | Refills: 11 | Status: SHIPPED | OUTPATIENT
Start: 2019-05-14 | End: 2021-06-09

## 2019-05-14 RX ORDER — VALACYCLOVIR HYDROCHLORIDE 1 G/1
1000 TABLET, FILM COATED ORAL 2 TIMES DAILY
Qty: 20 TABLET | Refills: 0 | Status: SHIPPED | OUTPATIENT
Start: 2019-05-14 | End: 2020-02-14

## 2019-05-15 LAB
T4 FREE SERPL-MCNC: 1.33 NG/DL (ref 0.93–1.7)
TSH SERPL DL<=0.05 MIU/L-ACNC: 1.54 MIU/ML (ref 0.27–4.2)

## 2019-05-16 LAB — INSULIN SERPL-ACNC: 13.8 UIU/ML (ref 2.6–24.9)

## 2019-06-03 NOTE — PROGRESS NOTES
Subjective:     Chief Complaint:   Chief Complaint   Patient presents with   • Annual Exam       Carlota Rodriguez is a 58 y.o. female who presents for annual physical exam. She states that she is doing well in the past year. She reports no health problems reported. She is on a combination pill for her blood pressure, which is helping to control her blood pressure. No dizziness, lightheadedness, chest pain or shortness of breath reported. She has a history of asthma, and she takes albuterol only as needed. She states that she does not have to use it as often. She is concerned about her weight. She reports that she has gained about 16 lbs since last office visit. She is wanting to work on losing weight. Have discussed with patient about dietary habits and incorporating aerobic exercise to regimen.     Past Medical Hx:  Past Medical History:   Diagnosis Date   • Allergic    • Anxiety    • Arthritis    • Asthma    • Depression    • Headache    • Heart murmur    • Hordeolum     KLAUDIA   • Low back pain    • Obesity    • Pneumonia    • Urinary tract infection    • Visual impairment        Past Surgical Hx:  Past Surgical History:   Procedure Laterality Date   • BACK SURGERY     • CARDIAC CATHETERIZATION N/A 10/2/2018    Procedure: Right Heart Cath;  Surgeon: Rajendra Zurita MD PhD;  Location: [facility] INVASIVE LOCATION;  Service: Cardiology   • COLONOSCOPY     • FOOT FRACTURE SURGERY      bilateral ankle surgery   • HYSTERECTOMY         Health Maintenance:  Health Maintenance   Topic Date Due   • ANNUAL PHYSICAL  07/16/1963   • ZOSTER VACCINE (2 of 3) 07/13/2019 (Originally 3/16/2017)   • INFLUENZA VACCINE  08/01/2019   • MAMMOGRAM  09/19/2019   • COLONOSCOPY  06/12/2025   • TDAP/TD VACCINES (2 - Td) 06/05/2026   • HEPATITIS C SCREENING  Completed   • PAP SMEAR  Discontinued       Current Meds:    Current Outpatient Medications:   •  albuterol sulfate  (90 Base) MCG/ACT inhaler, Inhale 2 puffs Every 4  (Four) Hours As Needed for Wheezing or Shortness of Air., Disp: 18 g, Rfl: 11  •  lisinopril-hydrochlorothiazide (PRINZIDE,ZESTORETIC) 10-12.5 MG per tablet, Take 1 tablet by mouth Daily., Disp: 30 tablet, Rfl: 3  •  meloxicam (MOBIC) 7.5 MG tablet, Take 1 tablet by mouth Daily., Disp: 30 tablet, Rfl: 2  •  valACYclovir (VALTREX) 1000 MG tablet, Take 1 tablet by mouth 2 (Two) Times a Day., Disp: 20 tablet, Rfl: 0    Allergies:  Patient has no known allergies.    Family Hx:  Family History   Problem Relation Age of Onset   • Diabetes Mother    • Stroke Father    • Heart disease Father    • Anxiety disorder Sister    • Depression Sister    • Thyroid disease Sister    • Diabetes Brother    • Early death Brother    • Stroke Brother    • Cancer Maternal Aunt    • Diabetes Maternal Aunt    • Diabetes Maternal Uncle    • Diabetes Paternal Aunt    • Diabetes Paternal Uncle    • Depression Paternal Grandmother    • Alcohol abuse Neg Hx         Social History:  Social History     Socioeconomic History   • Marital status:      Spouse name: Not on file   • Number of children: Not on file   • Years of education: Not on file   • Highest education level: Not on file   Tobacco Use   • Smoking status: Never Smoker   • Smokeless tobacco: Never Used   Substance and Sexual Activity   • Alcohol use: No   • Drug use: No   • Sexual activity: Yes       Review of Systems  Review of Systems   Constitutional: Negative for appetite change, chills and fever.   HENT: Negative for congestion, ear pain, rhinorrhea, sneezing and sore throat.    Respiratory: Negative for cough and shortness of breath.    Cardiovascular: Negative for chest pain, palpitations and leg swelling.   Gastrointestinal: Negative for diarrhea, nausea and vomiting.   Endocrine: Negative for polyphagia and polyuria.   Musculoskeletal: Positive for arthralgias. Negative for back pain and neck pain.   Skin: Negative for color change and rash.   Neurological: Negative for  "dizziness, syncope and weakness.   Psychiatric/Behavioral: Negative for agitation, confusion and dysphoric mood.       Objective:     /70 (BP Location: Right arm)   Pulse 89   Ht 162.6 cm (64.02\")   Wt 88.9 kg (196 lb)   SpO2 98%   BMI 33.63 kg/m²     Physical Exam   Constitutional: She is oriented to person, place, and time. She appears well-developed and well-nourished.   Cardiovascular: Normal rate, regular rhythm and normal heart sounds. Exam reveals no gallop and no friction rub.   No murmur heard.  Pulmonary/Chest: Effort normal and breath sounds normal. No respiratory distress. She has no wheezes. She has no rales.   Abdominal: Soft. Bowel sounds are normal. There is no tenderness.   Musculoskeletal: Normal range of motion. She exhibits no edema.   Neurological: She is alert and oriented to person, place, and time.   Skin: Skin is warm and dry.   Psychiatric: She has a normal mood and affect. Her behavior is normal.   Vitals reviewed.         Assessment/Plan:     Carlota was seen today for annual exam.    Diagnoses and all orders for this visit:    Obesity (BMI 30.0-34.9)  -     TSH; Future  -     T4, free; Future  -     Insulin, Total; Future    Mild intermittent asthma without complication  -     albuterol sulfate  (90 Base) MCG/ACT inhaler; Inhale 2 puffs Every 4 (Four) Hours As Needed for Wheezing or Shortness of Air.    Diastolic dysfunction  -     lisinopril-hydrochlorothiazide (PRINZIDE,ZESTORETIC) 10-12.5 MG per tablet; Take 1 tablet by mouth Daily.    Osteoarthritis of multiple joints, unspecified osteoarthritis type  -     meloxicam (MOBIC) 7.5 MG tablet; Take 1 tablet by mouth Daily.    Other orders  -     valACYclovir (VALTREX) 1000 MG tablet; Take 1 tablet by mouth 2 (Two) Times a Day.        Return in about 1 year (around 5/14/2020) for Annual.    GOALS:  Improve diet and exercise   BARRIERS TO GOALS:  N/A      Preventative:  Female Preventative: Exercises regularly  Last " mammogram was 2017  Colon cancer screening is up to date  Cholesterol screening is up to date  up to date and documented   Recommended:Influenza  Tobacco: non smoker  Alcohol: does not drink  Diet/Exercise: eat more fruits and vegetables, decrease soda or juice intake, increase water intake, increase physical activity, reduce screen time, reduce portion size, cut out extra servings, reduce fast food intake, family to eat at dinner table more often, keep TV off during meals, plan meals, eat breakfast and have 3 meals a day    RISK SCORE: 4      This document has been electronically signed by Pedro Weeks MD on Marianne 3, 2019 11:01 AM

## 2019-10-03 DIAGNOSIS — I51.89 DIASTOLIC DYSFUNCTION: ICD-10-CM

## 2019-10-03 RX ORDER — LISINOPRIL AND HYDROCHLOROTHIAZIDE 12.5; 1 MG/1; MG/1
1 TABLET ORAL DAILY
Qty: 30 TABLET | Refills: 3 | Status: SHIPPED | OUTPATIENT
Start: 2019-10-03 | End: 2019-10-08 | Stop reason: SDUPTHER

## 2019-10-08 ENCOUNTER — OFFICE VISIT (OUTPATIENT)
Dept: FAMILY MEDICINE CLINIC | Facility: CLINIC | Age: 59
End: 2019-10-08

## 2019-10-08 VITALS
HEIGHT: 64 IN | DIASTOLIC BLOOD PRESSURE: 70 MMHG | WEIGHT: 209 LBS | BODY MASS INDEX: 35.68 KG/M2 | OXYGEN SATURATION: 98 % | SYSTOLIC BLOOD PRESSURE: 130 MMHG | HEART RATE: 119 BPM

## 2019-10-08 DIAGNOSIS — Z12.39 SCREENING FOR BREAST CANCER: ICD-10-CM

## 2019-10-08 DIAGNOSIS — Z00.00 ANNUAL PHYSICAL EXAM: Primary | ICD-10-CM

## 2019-10-08 DIAGNOSIS — I51.89 DIASTOLIC DYSFUNCTION: ICD-10-CM

## 2019-10-08 DIAGNOSIS — J30.2 SEASONAL ALLERGIC RHINITIS, UNSPECIFIED TRIGGER: ICD-10-CM

## 2019-10-08 PROCEDURE — 99213 OFFICE O/P EST LOW 20 MIN: CPT | Performed by: FAMILY MEDICINE

## 2019-10-08 RX ORDER — LISINOPRIL AND HYDROCHLOROTHIAZIDE 12.5; 1 MG/1; MG/1
1 TABLET ORAL DAILY
Qty: 30 TABLET | Refills: 3 | Status: SHIPPED | OUTPATIENT
Start: 2019-10-08 | End: 2020-03-17 | Stop reason: SDUPTHER

## 2019-10-08 RX ORDER — DIPHENHYDRAMINE HCL 25 MG
25 TABLET ORAL EVERY 6 HOURS PRN
Qty: 120 TABLET | Refills: 1 | Status: SHIPPED | OUTPATIENT
Start: 2019-10-08 | End: 2020-03-05

## 2019-10-08 NOTE — PROGRESS NOTES
S: Carlota Rodriguez is a 60 y/o F with MHx remarkable for diastolic dysfunction, asthma, and seasonal allergies presenting for annual/well visit. She feels well today but does complain of increasing seasonal allergies over the last six months. She also complains of possible cold starting last week (10/2) with associated drainage, sore throat, congestion, and dry cough. She is currently taking Wal Fex (Fexofenadine) OTC that she feels is not helping as it has in the past with colds and seasonal allergies. She has tried claritin, zyrtec, and flonase before. All previous medications have failed her over time similar to Wal Fex is now. She denies any SOB, chest pain, sinus pain, n/v, diarrhea, or fevers. She wants to try another allergy medication. She feels her cold isn't making her asthma worse, she rarely has to use her inhaler and hasn't used it in the last week.     She also notes that her sleep has suffered for the last year or so, problems getting to sleep, staying asleep, and waking up feeling unrested. She often goes to bed between 10-12, watches TV before bed, and has not tried anything for her sleep yet.     She follows Dr. Zurita for her diastolic dysfunction, most recently for R heart cath (10/2/18). She doesn't have a FU appointment with Dr. Zurita at the moment. She denies any dizziness, headaches, or syncope in the last 3 months.     Otherwise she eats a balanced, low salt diet and lives at home with her  who she feels happy and safe with. She exercises less than she used to and wants to lose weight. She has no other acute concerns.    Meds:   Wal Fex (Fexofenadine) 180mg ID  Lisinopril/HCTZ 30mg ID  Albuterol Inh PRN  Valacyclovir 1000mg PRN    O:   General: Appears alert and answers questions appropriately  HEENT: TM patent and non-erythematous bilaterally, eyes equally reactive to light bilaterally, oropharynx erythematous, no LAD of the ant or post cervical nodes, thyroid palpated as normal  size with no nodules, no sinus tenderness  CV: RRR, whooshing diastolic murmur noted loudest at superior L margin of sternum, no rub noted, cap refill <2s  Pulm: Clear to auscultation bilaterally with no wheezes, rhonchi or rales  Abdominal: Nml bowel sounds, no tenderness, no guarding, no rebound, spleen and liver not palpable  MSK: ROM and strength of spine and limbs intact, pain on palpation of both ankles near previous surgical sites  Neuro: Reflexes intact 3+, cranial nerves grossly intact  Skin: Dry and warm, no lesions or rashes    A/P: Carlota Rodriguez is a 60 y/o F presenting for worsening allergies and difficulty sleeping at her annual visit with a history of diastolic dysfunction.     1. Allergies: Patient has tried Zyrtec, Claritin, Flonase, and Wal Fex (generic fexofenadine).Current symptoms of congestion and conjunctival itching are likely allergic rhinitis, cough and sore throat could be related to viral URI given lack of fever, n/v, or diarrhea.  Allergies don't seem to exacerbate her asthma, and she has not had many if any asthma exacerbations in the past and she rarely needs her inhaler. No SOB, wheezing, rales, or chest pain, with good cap refill, make pneumonia or bacterial infection unlikely. Patient feels she is improving but wants long term allergy relief as her wal fex isn't working as well as it previously did.  Suggest trying benadryl before hydroxyzine for lesser side fx profile and ease of access.     2. Sleep Difficulty: Patient admits to and acknowledges her poor sleep hygiene, and her sleep is poor even when she is able to sleep. Discussed turning all screens off an hour before bed, having a consistent bedtime, and a bedtime routine to help sleep hygiene before pursuing medical treatment for it. Discussed further options if sleep hygiene doesn't improve the problem, can try melatonin trial at next appt if not improving with life style mods.     3. Sandra Dysfx: Followed by Dr. Zurita,  hasn't seen cardio in a year, isn't sure if she was supposed to FU or not after R heart cath on 10/2/18. Findings showed diastolic dysfunction. Will refer her to Dr. Zurita for FU.     4. BP: Well controlled on lisinopril/HCTZ ID, BP today 130/70, consistent with out of office readings. No sx of hypo or hypertension, no need for intervention. Will refill meds.     5. Preventative Care: Will give referral to cardio per above, as well [facility]. Patient asked about pneumovax, given no exacerbations, no smoking history, no COPD, will delay until 64, patient is ok with this. Already received flu vax. Will give Shingles vax Rx for pharmacy. Otherwise healthy and up to date.

## 2019-10-09 NOTE — PROGRESS NOTES
Subjective:     Chief Complaint:   Chief Complaint   Patient presents with   • Annual Exam       Carlota Rodriguez is a 59 y.o. female who presents for evaluation for seasonal allergies. Patient states that she has been complaining of symptoms of nasal congestion and sinus pressure. She states that these symptoms have been going on for the past 2 weeks. She denies any fevers or chills. States that she complains of having congestion throughout the day. She complains of having a dry cough and scratchy throat as well. She states that when she has these symptoms, her eyes will get watery and itchy. She has tried a number of medications for these symptoms in the past, which include fexofenadine, claritin. Patient states that her blood pressure has been doing well with lisinopril-HCTZ. She denies having any hypotensive episodes. She has gotten her influenza vaccine already at a local pharmacy. She is due for a shingles vaccine, and will get a prescription to take at her pharmacy. She is also due for a screening mammogram today which she is agreeable to get.     Past Medical Hx:  Past Medical History:   Diagnosis Date   • Allergic    • Anxiety    • Arthritis    • Asthma    • Depression    • Headache    • Heart murmur    • Hordeolum     KLAUDIA   • Low back pain    • Obesity    • Pneumonia    • Urinary tract infection    • Visual impairment        Past Surgical Hx:  Past Surgical History:   Procedure Laterality Date   • BACK SURGERY     • CARDIAC CATHETERIZATION N/A 10/2/2018    Procedure: Right Heart Cath;  Surgeon: Rajendra Zuirta MD PhD;  Location: [facility] INVASIVE LOCATION;  Service: Cardiology   • COLONOSCOPY     • FOOT FRACTURE SURGERY      bilateral ankle surgery   • HYSTERECTOMY         Health Maintenance:  Health Maintenance   Topic Date Due   • ANNUAL PHYSICAL  07/16/1963   • ZOSTER VACCINE (2 of 3) 03/16/2017   • MAMMOGRAM  09/19/2019   • COLONOSCOPY  06/12/2025   • TDAP/TD VACCINES (2 - Td) 06/05/2026    • HEPATITIS C SCREENING  Completed   • INFLUENZA VACCINE  Completed   • PAP SMEAR  Discontinued       Current Meds:    Current Outpatient Medications:   •  albuterol sulfate  (90 Base) MCG/ACT inhaler, Inhale 2 puffs Every 4 (Four) Hours As Needed for Wheezing or Shortness of Air., Disp: 18 g, Rfl: 11  •  lisinopril-hydrochlorothiazide (PRINZIDE,ZESTORETIC) 10-12.5 MG per tablet, Take 1 tablet by mouth Daily., Disp: 30 tablet, Rfl: 3  •  valACYclovir (VALTREX) 1000 MG tablet, Take 1 tablet by mouth 2 (Two) Times a Day., Disp: 20 tablet, Rfl: 0  •  diphenhydrAMINE (BENADRYL ALLERGY) 25 MG tablet, Take 1 tablet by mouth Every 6 (Six) Hours As Needed for Allergies., Disp: 120 tablet, Rfl: 1    Allergies:  Patient has no known allergies.    Family Hx:  Family History   Problem Relation Age of Onset   • Diabetes Mother    • Stroke Father    • Heart disease Father    • Anxiety disorder Sister    • Depression Sister    • Thyroid disease Sister    • Diabetes Brother    • Early death Brother    • Stroke Brother    • Cancer Maternal Aunt    • Diabetes Maternal Aunt    • Diabetes Maternal Uncle    • Diabetes Paternal Aunt    • Diabetes Paternal Uncle    • Depression Paternal Grandmother    • Alcohol abuse Neg Hx         Social History:  Social History     Socioeconomic History   • Marital status:      Spouse name: Not on file   • Number of children: Not on file   • Years of education: Not on file   • Highest education level: Not on file   Tobacco Use   • Smoking status: Never Smoker   • Smokeless tobacco: Never Used   Substance and Sexual Activity   • Alcohol use: No   • Drug use: No   • Sexual activity: Yes       Review of Systems  Review of Systems   Constitutional: Negative for appetite change, chills and fever.   HENT: Negative for congestion, ear pain, rhinorrhea, sneezing and sore throat.    Respiratory: Negative for cough and shortness of breath.    Cardiovascular: Negative for chest pain, palpitations  "and leg swelling.   Gastrointestinal: Negative for diarrhea, nausea and vomiting.   Endocrine: Negative for polyphagia and polyuria.   Musculoskeletal: Positive for arthralgias. Negative for back pain and neck pain.   Skin: Negative for color change and rash.   Neurological: Negative for dizziness, syncope and weakness.   Psychiatric/Behavioral: Negative for agitation, confusion and dysphoric mood.       Objective:     /70 (BP Location: Right arm, Patient Position: Sitting, Cuff Size: Adult)   Pulse 119   Ht 162.6 cm (64\")   Wt 94.8 kg (209 lb)   SpO2 98%   BMI 35.87 kg/m²     Physical Exam   Constitutional: She is oriented to person, place, and time. She appears well-developed and well-nourished.   HENT:   Head: Normocephalic and atraumatic.   Right Ear: External ear normal.   Left Ear: External ear normal.   Nose: Nose normal.   Mouth/Throat: Oropharynx is clear and moist.   Eyes: Conjunctivae and EOM are normal. Pupils are equal, round, and reactive to light.   Neck: Normal range of motion. Neck supple.   Cardiovascular: Normal rate, regular rhythm and normal heart sounds. Exam reveals no gallop and no friction rub.   No murmur heard.  Pulmonary/Chest: Effort normal and breath sounds normal. No respiratory distress. She has no wheezes. She has no rales.   Abdominal: Soft. Bowel sounds are normal. There is no tenderness.   Musculoskeletal: Normal range of motion. She exhibits no edema.   Neurological: She is alert and oriented to person, place, and time.   Skin: Skin is warm and dry.   Psychiatric: She has a normal mood and affect. Her behavior is normal.   Vitals reviewed.         Assessment/Plan:     Carlota was seen today for annual exam.    Diagnoses and all orders for this visit:    Annual physical exam    Seasonal allergic rhinitis, unspecified trigger  -     diphenhydrAMINE (BENADRYL ALLERGY) 25 MG tablet; Take 1 tablet by mouth Every 6 (Six) Hours As Needed for Allergies.    Diastolic " dysfunction  -     lisinopril-hydrochlorothiazide (PRINZIDE,ZESTORETIC) 10-12.5 MG per tablet; Take 1 tablet by mouth Daily.    Screening for breast cancer  -     Mammo Screening Digital Tomosynthesis Bilateral With CAD; Future        Return in about 6 months (around 4/8/2020) for Next scheduled follow up.    GOALS:  Improve diet and exercise   BARRIERS TO GOALS:  N/A      Preventative:  Female Preventative: Exercises regularly  Last mammogram was 2017  Colon cancer screening is up to date  Cholesterol screening is up to date  up to date and documented   Recommended:Influenza  Tobacco: non smoker  Alcohol: does not drink  Diet/Exercise: eat more fruits and vegetables, decrease soda or juice intake, increase water intake, increase physical activity, reduce screen time, reduce portion size, cut out extra servings, reduce fast food intake, family to eat at dinner table more often, keep TV off during meals, plan meals, eat breakfast and have 3 meals a day    RISK SCORE: 4      This document has been electronically signed by Pedro Weeks MD on October 9, 2019 2:27 PM

## 2020-02-14 ENCOUNTER — OFFICE VISIT (OUTPATIENT)
Dept: PODIATRY | Facility: CLINIC | Age: 60
End: 2020-02-14

## 2020-02-14 VITALS — BODY MASS INDEX: 35.65 KG/M2 | OXYGEN SATURATION: 98 % | HEIGHT: 64 IN | HEART RATE: 110 BPM | WEIGHT: 208.8 LBS

## 2020-02-14 DIAGNOSIS — M20.12 HALLUX VALGUS OF LEFT FOOT: Primary | ICD-10-CM

## 2020-02-14 DIAGNOSIS — M79.672 LEFT FOOT PAIN: ICD-10-CM

## 2020-02-14 PROCEDURE — 99203 OFFICE O/P NEW LOW 30 MIN: CPT | Performed by: PODIATRIST

## 2020-02-14 NOTE — PROGRESS NOTES
Carlota Rodriguez  1960  59 y.o. female     Patient presents to clinic today with complaint of a possible bunion on her left foot. Patient states pain is about a 5/10 while walking.    02/14/2020  Chief Complaint   Patient presents with   • Left Foot - Pain, Bunions           History of Present Illness    Carlota Rodriguez is a 59 y.o. female presents for evaluation of left foot pain related to chronic bunion deformity.  Patient states this is a chronic, slowly worsening pain.  She describes pain as achy at times sharp and worse in close toed shoe gear.  She denies any recent trauma or injuries.  She is tried shoe alterations and some padding but denies any other formal treatments.  She does have prior history of right total ankle replacement and left calcaneus open reduction internal fixation.      Past Medical History:   Diagnosis Date   • Allergic    • Anxiety    • Arthritis    • Asthma    • Bunion    • Callus    • Depression    • Headache    • Heart murmur    • Hordeolum     KLAUDIA   • Hypertension    • Low back pain    • Obesity    • Pneumonia    • Urinary tract infection    • Visual impairment          Past Surgical History:   Procedure Laterality Date   • ANKLE ARTHROPLASTY Right 2018   • BACK SURGERY     • CARDIAC CATHETERIZATION N/A 10/2/2018    Procedure: Right Heart Cath;  Surgeon: Rajendra Zurita MD PhD;  Location: [facility] CATH INVASIVE LOCATION;  Service: Cardiology   • COLONOSCOPY     • FOOT FRACTURE SURGERY      bilateral ankle surgery   • HYSTERECTOMY           Family History   Problem Relation Age of Onset   • Diabetes Mother    • Osteoporosis Mother    • Stroke Father    • Heart disease Father    • Anxiety disorder Sister    • Depression Sister    • Thyroid disease Sister    • Diabetes Brother    • Early death Brother    • Stroke Brother    • Cancer Maternal Aunt    • Diabetes Maternal Aunt    • Diabetes Maternal Uncle    • Diabetes Paternal Aunt    • Diabetes Paternal Uncle    •  "Depression Paternal Grandmother    • Diabetes Other    • Alcohol abuse Neg Hx          Social History     Socioeconomic History   • Marital status:      Spouse name: Not on file   • Number of children: Not on file   • Years of education: Not on file   • Highest education level: Not on file   Tobacco Use   • Smoking status: Never Smoker   • Smokeless tobacco: Never Used   Substance and Sexual Activity   • Alcohol use: No   • Drug use: No   • Sexual activity: Yes         Current Outpatient Medications   Medication Sig Dispense Refill   • albuterol sulfate  (90 Base) MCG/ACT inhaler Inhale 2 puffs Every 4 (Four) Hours As Needed for Wheezing or Shortness of Air. 18 g 11   • lisinopril-hydrochlorothiazide (PRINZIDE,ZESTORETIC) 10-12.5 MG per tablet Take 1 tablet by mouth Daily. 30 tablet 3   • diphenhydrAMINE (BENADRYL ALLERGY) 25 MG tablet Take 1 tablet by mouth Every 6 (Six) Hours As Needed for Allergies. 120 tablet 1     No current facility-administered medications for this visit.          OBJECTIVE    Pulse 110   Ht 162.6 cm (64\")   Wt 94.7 kg (208 lb 12.8 oz)   SpO2 98%   BMI 35.84 kg/m²       Review of Systems   Constitutional: Negative.    HENT: Negative.    Eyes: Negative.    Respiratory: Negative.    Cardiovascular: Negative.    Gastrointestinal: Negative.    Endocrine: Negative.    Genitourinary: Negative.    Musculoskeletal: Positive for arthralgias and joint swelling.        Joint pain, foot pain, ankle pain   Skin: Negative.    Allergic/Immunologic: Negative.    Neurological: Negative.    Hematological: Negative.    Psychiatric/Behavioral: Negative.          Physical Exam   Constitutional: she appears well-developed and well-nourished.   HEENT: Normocephalic. Atraumatic  CV: No CP. RRR  Resp: Non-labored respirations  Psychiatric: she has a normal mood and affect. her behavior is normal.         Lower Extremity Exam:  Vascular: DP/PT pulses palpable 2+.   Minimal right foot edema  Foot " warm  CFT wnl  Neuro: Protective sensation intact, b/l.  DTRs intact  Integument: No open wounds or lesions.  No erythema, scaling  Skin quality normal  Musculoskeletal: LE muscle strength 5/5.   Gait normal  Ankle ROM decreased without pain or crepitus  STJ ROM full without pain or crepitus  1st MTPJ ROM full without tenderness. Moderate HAV  +Dorsomedial 1st mtpj eminence. Mild tenderness on palpation.              ASSESSMENT AND PLAN    Carlota was seen today for pain and bunions.    Diagnoses and all orders for this visit:    Hallux valgus of left foot    Left foot pain  -     XR Foot 3+ View Left      -Comprehensive foot and ankle exam performed  -Radiographs reviewed  -Educated pt on diagnosis, etiology and treatment of hallux abducto valgus.  -Recommend soft, wide toe box accommodative shoe gear.  -NSAIDs as needed  -Did discuss possible surgical correction.  Specifically for this patient she would likely require a Lapidus bunionectomy for her moderate deformity.  We briefly discussed the risks and benefits as well as expected postoperative course.  She would like to think about this longer prior to committing to this treatment option.  -Recheck as needed            This document has been electronically signed by Craig Stark DPM on February 15, 2020 8:45 AM     EMR Dragon/Transcription disclaimer:   Much of this encounter note is an electronic transcription/translation of spoken language to printed text. The electronic translation of spoken language may permit erroneous, or at times, nonsensical words or phrases to be inadvertently transcribed; Although I have reviewed the note for such errors, some may still exist.    Craig Stark DPM  2/15/2020  8:45 AM

## 2020-02-20 ENCOUNTER — TRANSCRIBE ORDERS (OUTPATIENT)
Dept: LAB | Facility: HOSPITAL | Age: 60
End: 2020-02-20

## 2020-02-20 ENCOUNTER — APPOINTMENT (OUTPATIENT)
Dept: LAB | Facility: HOSPITAL | Age: 60
End: 2020-02-20

## 2020-02-20 DIAGNOSIS — Z79.899 ENCOUNTER FOR LONG-TERM (CURRENT) USE OF HIGH-RISK MEDICATION: Primary | ICD-10-CM

## 2020-02-20 LAB
ALBUMIN SERPL-MCNC: 4.4 G/DL (ref 3.5–5.2)
ALBUMIN/GLOB SERPL: 1.2 G/DL
ALP SERPL-CCNC: 149 U/L (ref 39–117)
ALT SERPL W P-5'-P-CCNC: 25 U/L (ref 1–33)
ANION GAP SERPL CALCULATED.3IONS-SCNC: 10.6 MMOL/L (ref 5–15)
AST SERPL-CCNC: 26 U/L (ref 1–32)
BASOPHILS # BLD AUTO: 0.08 10*3/MM3 (ref 0–0.2)
BASOPHILS NFR BLD AUTO: 1.4 % (ref 0–1.5)
BILIRUB SERPL-MCNC: 0.5 MG/DL (ref 0.2–1.2)
BUN BLD-MCNC: 17 MG/DL (ref 8–23)
BUN/CREAT SERPL: 17.3 (ref 7–25)
CALCIUM SPEC-SCNC: 10 MG/DL (ref 8.6–10.5)
CHLORIDE SERPL-SCNC: 99 MMOL/L (ref 98–107)
CO2 SERPL-SCNC: 27.4 MMOL/L (ref 22–29)
CREAT BLD-MCNC: 0.98 MG/DL (ref 0.57–1)
DEPRECATED RDW RBC AUTO: 39.1 FL (ref 37–54)
EOSINOPHIL # BLD AUTO: 0.32 10*3/MM3 (ref 0–0.4)
EOSINOPHIL NFR BLD AUTO: 5.7 % (ref 0.3–6.2)
ERYTHROCYTE [DISTWIDTH] IN BLOOD BY AUTOMATED COUNT: 11.6 % (ref 12.3–15.4)
GFR SERPL CREATININE-BSD FRML MDRD: 58 ML/MIN/1.73
GLOBULIN UR ELPH-MCNC: 3.6 GM/DL
GLUCOSE BLD-MCNC: 90 MG/DL (ref 65–99)
HCT VFR BLD AUTO: 36.7 % (ref 34–46.6)
HGB BLD-MCNC: 12.5 G/DL (ref 12–15.9)
IMM GRANULOCYTES # BLD AUTO: 0.02 10*3/MM3 (ref 0–0.05)
IMM GRANULOCYTES NFR BLD AUTO: 0.4 % (ref 0–0.5)
LYMPHOCYTES # BLD AUTO: 1.6 10*3/MM3 (ref 0.7–3.1)
LYMPHOCYTES NFR BLD AUTO: 28.5 % (ref 19.6–45.3)
MCH RBC QN AUTO: 31.5 PG (ref 26.6–33)
MCHC RBC AUTO-ENTMCNC: 34.1 G/DL (ref 31.5–35.7)
MCV RBC AUTO: 92.4 FL (ref 79–97)
MONOCYTES # BLD AUTO: 0.44 10*3/MM3 (ref 0.1–0.9)
MONOCYTES NFR BLD AUTO: 7.8 % (ref 5–12)
NEUTROPHILS # BLD AUTO: 3.15 10*3/MM3 (ref 1.7–7)
NEUTROPHILS NFR BLD AUTO: 56.2 % (ref 42.7–76)
NRBC BLD AUTO-RTO: 0 /100 WBC (ref 0–0.2)
PLATELET # BLD AUTO: 427 10*3/MM3 (ref 140–450)
PMV BLD AUTO: 10.7 FL (ref 6–12)
POTASSIUM BLD-SCNC: 4.4 MMOL/L (ref 3.5–5.2)
PROT SERPL-MCNC: 8 G/DL (ref 6–8.5)
RBC # BLD AUTO: 3.97 10*6/MM3 (ref 3.77–5.28)
SODIUM BLD-SCNC: 137 MMOL/L (ref 136–145)
WBC NRBC COR # BLD: 5.61 10*3/MM3 (ref 3.4–10.8)

## 2020-02-20 PROCEDURE — 80053 COMPREHEN METABOLIC PANEL: CPT | Performed by: NURSE PRACTITIONER

## 2020-02-20 PROCEDURE — 36415 COLL VENOUS BLD VENIPUNCTURE: CPT | Performed by: NURSE PRACTITIONER

## 2020-02-20 PROCEDURE — 85025 COMPLETE CBC W/AUTO DIFF WBC: CPT | Performed by: NURSE PRACTITIONER

## 2020-03-05 ENCOUNTER — OFFICE VISIT (OUTPATIENT)
Dept: FAMILY MEDICINE CLINIC | Facility: CLINIC | Age: 60
End: 2020-03-05

## 2020-03-05 VITALS
HEIGHT: 64 IN | BODY MASS INDEX: 35.34 KG/M2 | SYSTOLIC BLOOD PRESSURE: 118 MMHG | OXYGEN SATURATION: 98 % | WEIGHT: 207 LBS | DIASTOLIC BLOOD PRESSURE: 70 MMHG | HEART RATE: 95 BPM

## 2020-03-05 DIAGNOSIS — J30.2 SEASONAL ALLERGIC RHINITIS, UNSPECIFIED TRIGGER: Primary | ICD-10-CM

## 2020-03-05 PROCEDURE — 99213 OFFICE O/P EST LOW 20 MIN: CPT | Performed by: FAMILY MEDICINE

## 2020-03-05 RX ORDER — CETIRIZINE HYDROCHLORIDE 5 MG/1
5 TABLET ORAL DAILY
Qty: 30 TABLET | Refills: 2 | Status: SHIPPED | OUTPATIENT
Start: 2020-03-05 | End: 2021-04-28

## 2020-03-05 RX ORDER — METFORMIN HYDROCHLORIDE 500 MG/1
500 TABLET, EXTENDED RELEASE ORAL
Qty: 30 TABLET | Refills: 2 | Status: SHIPPED | OUTPATIENT
Start: 2020-03-05 | End: 2020-03-17 | Stop reason: SDUPTHER

## 2020-03-17 DIAGNOSIS — I51.89 DIASTOLIC DYSFUNCTION: ICD-10-CM

## 2020-03-17 RX ORDER — METFORMIN HYDROCHLORIDE 500 MG/1
500 TABLET, EXTENDED RELEASE ORAL
Qty: 90 TABLET | Refills: 3 | Status: SHIPPED | OUTPATIENT
Start: 2020-03-17 | End: 2020-10-13

## 2020-03-17 RX ORDER — LISINOPRIL AND HYDROCHLOROTHIAZIDE 12.5; 1 MG/1; MG/1
1 TABLET ORAL DAILY
Qty: 90 TABLET | Refills: 3 | Status: SHIPPED | OUTPATIENT
Start: 2020-03-17 | End: 2021-05-17

## 2020-03-20 NOTE — PROGRESS NOTES
Subjective:     Chief Complaint:   Chief Complaint   Patient presents with   • Blood Pressure Check   • Allergies       Carlota Rodriguez is a 59 y.o. female who presents for follow up on seasonal allergies. Patient states that throughout the day, the patient will complain of having a runny nose with nasal congestion and sinus pressure. She has a history of seasonal allergies. She states that she has been on Allegra and Claritin in the past for rhinitis symptoms, however these have not been as effective in controlling symptoms. Her blood pressure has been better controlled in the past several months. She is currently on Lisinopril-HCTZ and is currently well controlled. She denies any acute issues currently.       Past Medical Hx:  Past Medical History:   Diagnosis Date   • Allergic    • Anxiety    • Arthritis    • Asthma    • Bunion    • Callus    • Depression    • Headache    • Heart murmur    • Hordeolum     KLAUDIA   • Hypertension    • Low back pain    • Obesity    • Pneumonia    • Urinary tract infection    • Visual impairment        Past Surgical Hx:  Past Surgical History:   Procedure Laterality Date   • ANKLE ARTHROPLASTY Right 2018   • BACK SURGERY     • CARDIAC CATHETERIZATION N/A 10/2/2018    Procedure: Right Heart Cath;  Surgeon: Rajendra Zurita MD PhD;  Location: [facility] INVASIVE LOCATION;  Service: Cardiology   • COLONOSCOPY     • FOOT FRACTURE SURGERY      bilateral ankle surgery   • HYSTERECTOMY         Health Maintenance:  Health Maintenance   Topic Date Due   • PNEUMOCOCCAL VACCINE (19-64 MEDIUM RISK) (1 of 1 - PPSV23) 07/16/1978   • ANNUAL PHYSICAL  10/09/2020   • MAMMOGRAM  10/25/2021   • COLONOSCOPY  06/12/2025   • TDAP/TD VACCINES (2 - Td) 06/05/2026   • HEPATITIS C SCREENING  Completed   • INFLUENZA VACCINE  Completed   • ZOSTER VACCINE  Completed   • PAP SMEAR  Discontinued       Current Meds:    Current Outpatient Medications:   •  albuterol sulfate  (90 Base) MCG/ACT  inhaler, Inhale 2 puffs Every 4 (Four) Hours As Needed for Wheezing or Shortness of Air., Disp: 18 g, Rfl: 11  •  cetirizine (zyrTEC) 5 MG tablet, Take 1 tablet by mouth Daily., Disp: 30 tablet, Rfl: 2  •  lisinopril-hydrochlorothiazide (PRINZIDE,ZESTORETIC) 10-12.5 MG per tablet, Take 1 tablet by mouth Daily., Disp: 90 tablet, Rfl: 3  •  metFORMIN ER (GLUCOPHAGE-XR) 500 MG 24 hr tablet, Take 1 tablet by mouth Daily With Breakfast., Disp: 90 tablet, Rfl: 3    Allergies:  Patient has no known allergies.    Family Hx:  Family History   Problem Relation Age of Onset   • Diabetes Mother    • Osteoporosis Mother    • Stroke Father    • Heart disease Father    • Anxiety disorder Sister    • Depression Sister    • Thyroid disease Sister    • Diabetes Brother    • Early death Brother    • Stroke Brother    • Cancer Maternal Aunt    • Diabetes Maternal Aunt    • Diabetes Maternal Uncle    • Diabetes Paternal Aunt    • Diabetes Paternal Uncle    • Depression Paternal Grandmother    • Diabetes Other    • Alcohol abuse Neg Hx         Social History:  Social History     Socioeconomic History   • Marital status:      Spouse name: Not on file   • Number of children: Not on file   • Years of education: Not on file   • Highest education level: Not on file   Tobacco Use   • Smoking status: Never Smoker   • Smokeless tobacco: Never Used   Substance and Sexual Activity   • Alcohol use: No   • Drug use: No   • Sexual activity: Yes       Review of Systems  Review of Systems   Constitutional: Negative for chills and fever.   HENT: Positive for congestion, sinus pain and sneezing.    Respiratory: Negative for shortness of breath.    Cardiovascular: Negative for chest pain.   Gastrointestinal: Negative for abdominal pain, diarrhea, nausea and vomiting.   Genitourinary: Negative for dysuria.   Neurological: Negative for dizziness.       Objective:     /70 (BP Location: Left arm, Patient Position: Sitting, Cuff Size: Adult)    "Pulse 95   Ht 162.6 cm (64\")   Wt 93.9 kg (207 lb)   SpO2 98%   BMI 35.53 kg/m²     Physical Exam   Constitutional: She is oriented to person, place, and time. She appears well-developed and well-nourished.   HENT:   Head: Normocephalic and atraumatic.   Right Ear: External ear normal.   Left Ear: External ear normal.   Nose: Nose normal.   Mouth/Throat: Oropharynx is clear and moist.   Eyes: Pupils are equal, round, and reactive to light. Conjunctivae and EOM are normal.   Neck: Normal range of motion. Neck supple.   Cardiovascular: Normal rate, regular rhythm and normal heart sounds. Exam reveals no gallop and no friction rub.   No murmur heard.  Pulmonary/Chest: Effort normal and breath sounds normal. No respiratory distress. She has no wheezes. She has no rales.   Abdominal: Soft. Bowel sounds are normal. There is no tenderness.   Musculoskeletal: Normal range of motion. She exhibits no edema.   Neurological: She is alert and oriented to person, place, and time.   Skin: Skin is warm and dry.   Psychiatric: She has a normal mood and affect. Her behavior is normal.   Vitals reviewed.         Assessment/Plan:     Carlota was seen today for blood pressure check and allergies.    Diagnoses and all orders for this visit:    Seasonal allergic rhinitis, unspecified trigger  -     cetirizine (zyrTEC) 5 MG tablet; Take 1 tablet by mouth Daily.    Other orders  -     Discontinue: metFORMIN ER (GLUCOPHAGE-XR) 500 MG 24 hr tablet; Take 1 tablet by mouth Daily With Breakfast.        Return in about 6 months (around 9/5/2020) for Next scheduled follow up.    GOALS:  Improve diet and exercise   BARRIERS TO GOALS:  N/A      Preventative:  Female Preventative: Exercises regularly  Last mammogram was 2017  Colon cancer screening is up to date  Cholesterol screening is up to date  up to date and documented   Recommended:Influenza  Tobacco: non smoker  Alcohol: does not drink  Diet/Exercise: eat more fruits and vegetables, " decrease soda or juice intake, increase water intake, increase physical activity, reduce screen time, reduce portion size, cut out extra servings, reduce fast food intake, family to eat at dinner table more often, keep TV off during meals, plan meals, eat breakfast and have 3 meals a day    RISK SCORE: 4      This document has been electronically signed by Pedro Weeks MD on March 20, 2020 13:34

## 2020-06-16 DIAGNOSIS — L65.9 HAIR LOSS DISORDER: Primary | ICD-10-CM

## 2020-06-16 RX ORDER — SPIRONOLACTONE 50 MG/1
50 TABLET, FILM COATED ORAL DAILY
Qty: 30 TABLET | Refills: 0 | Status: SHIPPED | OUTPATIENT
Start: 2020-06-16 | End: 2020-07-13

## 2020-06-16 RX ORDER — TRIAMCINOLONE ACETONIDE 1 MG/G
CREAM TOPICAL 2 TIMES DAILY
Qty: 453.6 G | Refills: 0 | Status: SHIPPED | OUTPATIENT
Start: 2020-06-16 | End: 2021-04-28

## 2020-07-11 DIAGNOSIS — L65.9 HAIR LOSS DISORDER: ICD-10-CM

## 2020-07-13 RX ORDER — SPIRONOLACTONE 50 MG/1
50 TABLET, FILM COATED ORAL DAILY
Qty: 30 TABLET | Refills: 0 | Status: SHIPPED | OUTPATIENT
Start: 2020-07-13 | End: 2020-08-14

## 2020-08-14 DIAGNOSIS — L65.9 HAIR LOSS DISORDER: ICD-10-CM

## 2020-08-14 RX ORDER — SPIRONOLACTONE 50 MG/1
50 TABLET, FILM COATED ORAL DAILY
Qty: 30 TABLET | Refills: 0 | Status: SHIPPED | OUTPATIENT
Start: 2020-08-14 | End: 2020-09-08

## 2020-09-08 DIAGNOSIS — L65.9 HAIR LOSS DISORDER: ICD-10-CM

## 2020-09-08 RX ORDER — SPIRONOLACTONE 50 MG/1
50 TABLET, FILM COATED ORAL DAILY
Qty: 30 TABLET | Refills: 0 | Status: SHIPPED | OUTPATIENT
Start: 2020-09-08 | End: 2020-10-05

## 2020-10-03 DIAGNOSIS — L65.9 HAIR LOSS DISORDER: ICD-10-CM

## 2020-10-05 RX ORDER — SPIRONOLACTONE 50 MG/1
50 TABLET, FILM COATED ORAL DAILY
Qty: 30 TABLET | Refills: 0 | Status: SHIPPED | OUTPATIENT
Start: 2020-10-05 | End: 2020-10-13

## 2020-10-13 ENCOUNTER — LAB (OUTPATIENT)
Dept: LAB | Facility: HOSPITAL | Age: 60
End: 2020-10-13

## 2020-10-13 ENCOUNTER — OFFICE VISIT (OUTPATIENT)
Dept: FAMILY MEDICINE CLINIC | Facility: CLINIC | Age: 60
End: 2020-10-13

## 2020-10-13 VITALS
DIASTOLIC BLOOD PRESSURE: 62 MMHG | WEIGHT: 207 LBS | HEART RATE: 106 BPM | BODY MASS INDEX: 35.34 KG/M2 | SYSTOLIC BLOOD PRESSURE: 102 MMHG | HEIGHT: 64 IN | OXYGEN SATURATION: 98 %

## 2020-10-13 DIAGNOSIS — Z00.00 ANNUAL PHYSICAL EXAM: Primary | ICD-10-CM

## 2020-10-13 DIAGNOSIS — L65.9 HAIR LOSS DISORDER: ICD-10-CM

## 2020-10-13 DIAGNOSIS — Z13.1 SCREENING FOR DIABETES MELLITUS: ICD-10-CM

## 2020-10-13 DIAGNOSIS — Z13.220 SCREENING FOR CHOLESTEROL LEVEL: ICD-10-CM

## 2020-10-13 DIAGNOSIS — I51.89 DIASTOLIC DYSFUNCTION: ICD-10-CM

## 2020-10-13 DIAGNOSIS — L73.9 ACUTE FOLLICULITIS: ICD-10-CM

## 2020-10-13 DIAGNOSIS — E66.01 MORBID (SEVERE) OBESITY DUE TO EXCESS CALORIES (HCC): ICD-10-CM

## 2020-10-13 DIAGNOSIS — E55.9 VITAMIN D DEFICIENCY: ICD-10-CM

## 2020-10-13 PROCEDURE — 99396 PREV VISIT EST AGE 40-64: CPT | Performed by: FAMILY MEDICINE

## 2020-10-13 PROCEDURE — 80053 COMPREHEN METABOLIC PANEL: CPT

## 2020-10-13 PROCEDURE — 85025 COMPLETE CBC W/AUTO DIFF WBC: CPT

## 2020-10-13 PROCEDURE — 84439 ASSAY OF FREE THYROXINE: CPT

## 2020-10-13 PROCEDURE — 80061 LIPID PANEL: CPT

## 2020-10-13 PROCEDURE — 36415 COLL VENOUS BLD VENIPUNCTURE: CPT

## 2020-10-13 PROCEDURE — 83036 HEMOGLOBIN GLYCOSYLATED A1C: CPT

## 2020-10-13 PROCEDURE — 84443 ASSAY THYROID STIM HORMONE: CPT

## 2020-10-13 RX ORDER — ERGOCALCIFEROL 1.25 MG/1
50000 CAPSULE ORAL WEEKLY
Qty: 5 CAPSULE | Refills: 2 | Status: SHIPPED | OUTPATIENT
Start: 2020-10-13 | End: 2021-04-28

## 2020-10-14 LAB
ALBUMIN SERPL-MCNC: 4.5 G/DL (ref 3.5–5.2)
ALBUMIN/GLOB SERPL: 1.2 G/DL
ALP SERPL-CCNC: 144 U/L (ref 39–117)
ALT SERPL W P-5'-P-CCNC: 19 U/L (ref 1–33)
ANION GAP SERPL CALCULATED.3IONS-SCNC: 11.4 MMOL/L (ref 5–15)
AST SERPL-CCNC: 21 U/L (ref 1–32)
BASOPHILS # BLD AUTO: 0.12 10*3/MM3 (ref 0–0.2)
BASOPHILS NFR BLD AUTO: 1.3 % (ref 0–1.5)
BILIRUB SERPL-MCNC: 0.3 MG/DL (ref 0–1.2)
BUN SERPL-MCNC: 28 MG/DL (ref 8–23)
BUN/CREAT SERPL: 23.1 (ref 7–25)
CALCIUM SPEC-SCNC: 10.1 MG/DL (ref 8.6–10.5)
CHLORIDE SERPL-SCNC: 100 MMOL/L (ref 98–107)
CHOLEST SERPL-MCNC: 192 MG/DL (ref 0–200)
CO2 SERPL-SCNC: 24.6 MMOL/L (ref 22–29)
CREAT SERPL-MCNC: 1.21 MG/DL (ref 0.57–1)
DEPRECATED RDW RBC AUTO: 41.4 FL (ref 37–54)
EOSINOPHIL # BLD AUTO: 0.4 10*3/MM3 (ref 0–0.4)
EOSINOPHIL NFR BLD AUTO: 4.2 % (ref 0.3–6.2)
ERYTHROCYTE [DISTWIDTH] IN BLOOD BY AUTOMATED COUNT: 12.1 % (ref 12.3–15.4)
GFR SERPL CREATININE-BSD FRML MDRD: 45 ML/MIN/1.73
GLOBULIN UR ELPH-MCNC: 3.7 GM/DL
GLUCOSE SERPL-MCNC: 88 MG/DL (ref 65–99)
HBA1C MFR BLD: 5.78 % (ref 4.8–5.6)
HCT VFR BLD AUTO: 34 % (ref 34–46.6)
HDLC SERPL-MCNC: 61 MG/DL (ref 40–60)
HGB BLD-MCNC: 11.9 G/DL (ref 12–15.9)
IMM GRANULOCYTES # BLD AUTO: 0.03 10*3/MM3 (ref 0–0.05)
IMM GRANULOCYTES NFR BLD AUTO: 0.3 % (ref 0–0.5)
LDLC SERPL CALC-MCNC: 98 MG/DL (ref 0–100)
LDLC/HDLC SERPL: 1.5 {RATIO}
LYMPHOCYTES # BLD AUTO: 2.1 10*3/MM3 (ref 0.7–3.1)
LYMPHOCYTES NFR BLD AUTO: 22.2 % (ref 19.6–45.3)
MCH RBC QN AUTO: 32.6 PG (ref 26.6–33)
MCHC RBC AUTO-ENTMCNC: 35 G/DL (ref 31.5–35.7)
MCV RBC AUTO: 93.2 FL (ref 79–97)
MONOCYTES # BLD AUTO: 0.73 10*3/MM3 (ref 0.1–0.9)
MONOCYTES NFR BLD AUTO: 7.7 % (ref 5–12)
NEUTROPHILS NFR BLD AUTO: 6.08 10*3/MM3 (ref 1.7–7)
NEUTROPHILS NFR BLD AUTO: 64.3 % (ref 42.7–76)
NRBC BLD AUTO-RTO: 0 /100 WBC (ref 0–0.2)
PLATELET # BLD AUTO: 447 10*3/MM3 (ref 140–450)
PMV BLD AUTO: 10.9 FL (ref 6–12)
POTASSIUM SERPL-SCNC: 5.4 MMOL/L (ref 3.5–5.2)
PROT SERPL-MCNC: 8.2 G/DL (ref 6–8.5)
RBC # BLD AUTO: 3.65 10*6/MM3 (ref 3.77–5.28)
SODIUM SERPL-SCNC: 136 MMOL/L (ref 136–145)
T4 FREE SERPL-MCNC: 1.3 NG/DL (ref 0.93–1.7)
TRIGL SERPL-MCNC: 197 MG/DL (ref 0–150)
TSH SERPL DL<=0.05 MIU/L-ACNC: 3.35 UIU/ML (ref 0.27–4.2)
VLDLC SERPL-MCNC: 33 MG/DL (ref 5–40)
WBC # BLD AUTO: 9.46 10*3/MM3 (ref 3.4–10.8)

## 2020-10-26 NOTE — PROGRESS NOTES
[facility]  Pedro Weeks MD    Subjective:     Carlota Rodriguez is a 60 y.o. female who presents for annual physical. Patient reports that she has been having difficulty with weight loss. Patient has been working to change her dietary habits to incorporate more healthy options into her diet in addition to cutting down on caloric intake. She has also been exercising. She was started on metformin to help with insulin resistance but patient has not noticed any weight loss while on this medication. She is still interested in pharmacological management for obesity. In addition, patient has been noticing that she is having thinning of her hair in the past several months. She states that there are instances where her hair would be falling off easily. She denies any dry skin or nail changes. She has been noticing a skin lesion on her arm that has been draining clear discharge and mild erythema. She denies any fevers or chills. She denies any insect bites on her extremities.     The following portions of the patient's history were reviewed and updated as appropriate: allergies, current medications, past family history, past medical history, past social history, past surgical history and problem list.    Past Medical Hx:  Past Medical History:   Diagnosis Date   • Allergic    • Anxiety    • Arthritis    • Asthma    • Bunion    • Callus    • Depression    • Headache    • Heart murmur    • Hordeolum     KLAUDIA   • Hypertension    • Low back pain    • Obesity    • Pneumonia    • Urinary tract infection    • Visual impairment        Past Surgical Hx:  Past Surgical History:   Procedure Laterality Date   • ANKLE ARTHROPLASTY Right 2018   • BACK SURGERY     • CARDIAC CATHETERIZATION N/A 10/2/2018    Procedure: Right Heart Cath;  Surgeon: Rajendra Zurita MD PhD;  Location: [facility] INVASIVE LOCATION;  Service: Cardiology   • COLONOSCOPY     • FOOT FRACTURE SURGERY      bilateral ankle surgery   •  HYSTERECTOMY         Current Meds:    Current Outpatient Medications:   •  albuterol sulfate  (90 Base) MCG/ACT inhaler, Inhale 2 puffs Every 4 (Four) Hours As Needed for Wheezing or Shortness of Air., Disp: 18 g, Rfl: 11  •  cetirizine (zyrTEC) 5 MG tablet, Take 1 tablet by mouth Daily., Disp: 30 tablet, Rfl: 2  •  lisinopril-hydrochlorothiazide (PRINZIDE,ZESTORETIC) 10-12.5 MG per tablet, Take 1 tablet by mouth Daily., Disp: 90 tablet, Rfl: 3  •  triamcinolone (KENALOG) 0.1 % cream, Apply  topically to the appropriate area as directed 2 (Two) Times a Day., Disp: 453.6 g, Rfl: 0  •  mupirocin (Bactroban) 2 % ointment, Apply  topically to the appropriate area as directed 3 (Three) Times a Day., Disp: 1 g, Rfl: 0  •  naltrexone-bupropion ER (CONTRAVE) 8-90 MG tablet, Wk 1: 1 tab daily, Wk 2: 1 tab twice a day, Wk 3: 2 tabs in AM, 1 tab in PM, Wk 4: 2 tabs twice a day, Maintenance dose: 2 tabs twice daily., Disp: 120 tablet, Rfl: 0  •  vitamin D (ERGOCALCIFEROL) 1.25 MG (62632 UT) capsule capsule, Take 1 capsule by mouth 1 (One) Time Per Week., Disp: 5 capsule, Rfl: 2    Allergies:  No Known Allergies    Family Hx:  Family History   Problem Relation Age of Onset   • Diabetes Mother    • Osteoporosis Mother    • Stroke Father    • Heart disease Father    • Anxiety disorder Sister    • Depression Sister    • Thyroid disease Sister    • Diabetes Brother    • Early death Brother    • Stroke Brother    • Cancer Maternal Aunt    • Diabetes Maternal Aunt    • Diabetes Maternal Uncle    • Diabetes Paternal Aunt    • Diabetes Paternal Uncle    • Depression Paternal Grandmother    • Diabetes Other    • Alcohol abuse Neg Hx         Social History:  Social History     Socioeconomic History   • Marital status:      Spouse name: Not on file   • Number of children: Not on file   • Years of education: Not on file   • Highest education level: Not on file   Tobacco Use   • Smoking status: Never Smoker   • Smokeless  "tobacco: Never Used   Substance and Sexual Activity   • Alcohol use: No   • Drug use: No   • Sexual activity: Yes       Review of Systems  Review of Systems   Constitutional: Negative for chills and fever.   Respiratory: Negative for shortness of breath.    Cardiovascular: Negative for chest pain.   Gastrointestinal: Negative for abdominal pain, diarrhea, nausea and vomiting.   Genitourinary: Negative for dysuria.   Neurological: Negative for dizziness.       Objective:     /62   Pulse 106   Ht 162.6 cm (64\")   Wt 93.9 kg (207 lb)   SpO2 98%   BMI 35.53 kg/m²   Physical Exam  Constitutional:       Appearance: She is well-developed.   Cardiovascular:      Rate and Rhythm: Normal rate and regular rhythm.      Heart sounds: Normal heart sounds. No murmur. No friction rub. No gallop.    Pulmonary:      Effort: Pulmonary effort is normal. No respiratory distress.      Breath sounds: Normal breath sounds. No wheezing.   Abdominal:      General: Bowel sounds are normal.      Palpations: Abdomen is soft.      Tenderness: There is no abdominal tenderness.   Musculoskeletal: Normal range of motion.   Skin:     General: Skin is warm and dry.   Neurological:      Mental Status: She is alert and oriented to person, place, and time.   Psychiatric:         Behavior: Behavior normal.          Assessment/Plan:     Diagnoses and all orders for this visit:    1. Annual physical exam (Primary)    2. Morbid (severe) obesity due to excess calories (CMS/HCC)  -     naltrexone-bupropion ER (CONTRAVE) 8-90 MG tablet; Wk 1: 1 tab daily, Wk 2: 1 tab twice a day, Wk 3: 2 tabs in AM, 1 tab in PM, Wk 4: 2 tabs twice a day, Maintenance dose: 2 tabs twice daily.  Dispense: 120 tablet; Refill: 0    3. Acute folliculitis  -     mupirocin (Bactroban) 2 % ointment; Apply  topically to the appropriate area as directed 3 (Three) Times a Day.  Dispense: 1 g; Refill: 0    4. Hair loss disorder  -     CBC & Differential; Future  -     TSH; " Future  -     T4, free; Future    5. Screening for cholesterol level  -     Lipid panel; Future    6. Diastolic dysfunction  -     Comprehensive Metabolic Panel; Future    7. Screening for diabetes mellitus  -     Hemoglobin A1c; Future    8. Vitamin D deficiency  -     vitamin D (ERGOCALCIFEROL) 1.25 MG (36105 UT) capsule capsule; Take 1 capsule by mouth 1 (One) Time Per Week.  Dispense: 5 capsule; Refill: 2        · Rx changes: Added Contrave to patient's medication management. Patient instructed to monitor blood pressure closely while on medication and to keep track of her weight.   · Patient Education: NA  · Compliance at present is estimated to be good.   · Efforts to improve compliance (if necessary) will be directed at increased exercise.     Follow-up:     Return in about 6 months (around 4/13/2021).    Preventative:  Health Maintenance   Topic Date Due   • Pneumococcal Vaccine 0-64 (1 of 1 - PPSV23) 07/16/1966   • ANNUAL PHYSICAL  10/09/2020   • MAMMOGRAM  10/25/2021   • COLONOSCOPY  06/12/2025   • TDAP/TD VACCINES (2 - Td) 06/05/2026   • HEPATITIS C SCREENING  Completed   • INFLUENZA VACCINE  Completed   • ZOSTER VACCINE  Completed   • PAP SMEAR  Discontinued         Weight  -Class: Obese Class II: 35-39.9kg/m2  -Patient's Body mass index is 35.53 kg/m². BMI is above normal parameters. Recommendations include: pharmacological intervention.       Alcohol use:  reports no history of alcohol use.  Nicotine status  reports that she has never smoked. She has never used smokeless tobacco.    Goals    None         RISK SCORE: 4

## 2020-11-05 DIAGNOSIS — L65.9 HAIR LOSS DISORDER: ICD-10-CM

## 2020-11-05 RX ORDER — SPIRONOLACTONE 50 MG/1
50 TABLET, FILM COATED ORAL DAILY
Qty: 30 TABLET | Refills: 0 | Status: SHIPPED | OUTPATIENT
Start: 2020-11-05 | End: 2020-12-10

## 2020-12-10 DIAGNOSIS — L65.9 HAIR LOSS DISORDER: ICD-10-CM

## 2020-12-10 RX ORDER — SPIRONOLACTONE 50 MG/1
50 TABLET, FILM COATED ORAL DAILY
Qty: 30 TABLET | Refills: 0 | Status: SHIPPED | OUTPATIENT
Start: 2020-12-10 | End: 2021-04-28

## 2020-12-28 ENCOUNTER — TELEPHONE (OUTPATIENT)
Dept: FAMILY MEDICINE CLINIC | Facility: CLINIC | Age: 60
End: 2020-12-28

## 2020-12-28 NOTE — TELEPHONE ENCOUNTER
CALLED PATIENT TO RESCHEDULE HER 12/10 APT (CANCELED DUE TO COVID)     LEFT [facility] OFFERING TELEHEALTH     STEVEN CRUZ

## 2021-02-15 DIAGNOSIS — E66.01 MORBID (SEVERE) OBESITY DUE TO EXCESS CALORIES (HCC): ICD-10-CM

## 2021-02-17 RX ORDER — NALTREXONE HYDROCHLORIDE AND BUPROPION HYDROCHLORIDE 8; 90 MG/1; MG/1
TABLET, EXTENDED RELEASE ORAL
Qty: 120 TABLET | Refills: 8 | Status: SHIPPED | OUTPATIENT
Start: 2021-02-17 | End: 2021-04-28

## 2021-02-22 ENCOUNTER — IMMUNIZATION (OUTPATIENT)
Dept: VACCINE CLINIC | Facility: HOSPITAL | Age: 61
End: 2021-02-22

## 2021-02-22 PROCEDURE — 91300 HC SARSCOV02 VAC 30MCG/0.3ML IM: CPT | Performed by: THORACIC SURGERY (CARDIOTHORACIC VASCULAR SURGERY)

## 2021-02-22 PROCEDURE — 0001A: CPT | Performed by: THORACIC SURGERY (CARDIOTHORACIC VASCULAR SURGERY)

## 2021-03-15 ENCOUNTER — IMMUNIZATION (OUTPATIENT)
Dept: VACCINE CLINIC | Facility: HOSPITAL | Age: 61
End: 2021-03-15

## 2021-03-15 PROCEDURE — 0002A: CPT | Performed by: THORACIC SURGERY (CARDIOTHORACIC VASCULAR SURGERY)

## 2021-03-15 PROCEDURE — 91300 HC SARSCOV02 VAC 30MCG/0.3ML IM: CPT | Performed by: THORACIC SURGERY (CARDIOTHORACIC VASCULAR SURGERY)

## 2021-04-27 ENCOUNTER — LAB (OUTPATIENT)
Dept: LAB | Facility: HOSPITAL | Age: 61
End: 2021-04-27

## 2021-04-27 DIAGNOSIS — Z01.818 PREOP TESTING: Primary | ICD-10-CM

## 2021-04-27 LAB — SARS-COV-2 N GENE RESP QL NAA+PROBE: NOT DETECTED

## 2021-04-27 PROCEDURE — C9803 HOPD COVID-19 SPEC COLLECT: HCPCS

## 2021-04-27 PROCEDURE — 87635 SARS-COV-2 COVID-19 AMP PRB: CPT

## 2021-04-28 RX ORDER — MULTIPLE VITAMINS W/ MINERALS TAB 9MG-400MCG
1 TAB ORAL DAILY
COMMUNITY

## 2021-04-28 RX ORDER — CETIRIZINE HYDROCHLORIDE 5 MG/1
5 TABLET ORAL DAILY PRN
COMMUNITY
End: 2022-06-10

## 2021-04-28 RX ORDER — NALTREXONE HYDROCHLORIDE AND BUPROPION HYDROCHLORIDE 8; 90 MG/1; MG/1
1 TABLET, EXTENDED RELEASE ORAL 2 TIMES DAILY
COMMUNITY
End: 2022-06-10

## 2021-04-30 ENCOUNTER — HOSPITAL ENCOUNTER (OUTPATIENT)
Facility: HOSPITAL | Age: 61
Setting detail: HOSPITAL OUTPATIENT SURGERY
Discharge: HOME | End: 2021-04-30
Attending: OPHTHALMOLOGY | Admitting: OPHTHALMOLOGY

## 2021-04-30 ENCOUNTER — ANESTHESIA (OUTPATIENT)
Dept: PERIOP | Facility: HOSPITAL | Age: 61
End: 2021-04-30

## 2021-04-30 ENCOUNTER — ANESTHESIA EVENT (OUTPATIENT)
Dept: PERIOP | Facility: HOSPITAL | Age: 61
End: 2021-04-30

## 2021-04-30 VITALS
TEMPERATURE: 97 F | HEART RATE: 78 BPM | OXYGEN SATURATION: 100 % | DIASTOLIC BLOOD PRESSURE: 68 MMHG | RESPIRATION RATE: 18 BRPM | HEIGHT: 64 IN | BODY MASS INDEX: 35.98 KG/M2 | WEIGHT: 210.76 LBS | SYSTOLIC BLOOD PRESSURE: 128 MMHG

## 2021-04-30 PROCEDURE — 25010000002 FENTANYL CITRATE (PF) 100 MCG/2ML SOLUTION: Performed by: NURSE ANESTHETIST, CERTIFIED REGISTERED

## 2021-04-30 PROCEDURE — V2632 POST CHMBR INTRAOCULAR LENS: HCPCS | Performed by: OPHTHALMOLOGY

## 2021-04-30 PROCEDURE — 25010000002 EPINEPHRINE PER 0.1 MG: Performed by: OPHTHALMOLOGY

## 2021-04-30 PROCEDURE — 25010000002 VANCOMYCIN 1 G RECONSTITUTED SOLUTION 1 EACH VIAL: Performed by: OPHTHALMOLOGY

## 2021-04-30 PROCEDURE — 25010000002 MIDAZOLAM PER 1 MG: Performed by: NURSE ANESTHETIST, CERTIFIED REGISTERED

## 2021-04-30 DEVICE — LENS ACRYSOF IQ 6X13MM SN60WF 21.0: Type: IMPLANTABLE DEVICE | Site: EYE | Status: FUNCTIONAL

## 2021-04-30 RX ORDER — PREDNISOLONE ACETATE 10 MG/ML
SUSPENSION/ DROPS OPHTHALMIC AS NEEDED
Status: DISCONTINUED | OUTPATIENT
Start: 2021-04-30 | End: 2021-04-30 | Stop reason: HOSPADM

## 2021-04-30 RX ORDER — SODIUM CHLORIDE 0.9 % (FLUSH) 0.9 %
10 SYRINGE (ML) INJECTION AS NEEDED
Status: DISCONTINUED | OUTPATIENT
Start: 2021-04-30 | End: 2021-04-30 | Stop reason: HOSPADM

## 2021-04-30 RX ORDER — MIDAZOLAM HYDROCHLORIDE 1 MG/ML
INJECTION INTRAMUSCULAR; INTRAVENOUS AS NEEDED
Status: DISCONTINUED | OUTPATIENT
Start: 2021-04-30 | End: 2021-04-30 | Stop reason: SURG

## 2021-04-30 RX ORDER — FENTANYL CITRATE 50 UG/ML
INJECTION, SOLUTION INTRAMUSCULAR; INTRAVENOUS AS NEEDED
Status: DISCONTINUED | OUTPATIENT
Start: 2021-04-30 | End: 2021-04-30 | Stop reason: SURG

## 2021-04-30 RX ORDER — CYCLOPENTOLATE HYDROCHLORIDE 20 MG/ML
1 SOLUTION/ DROPS OPHTHALMIC
Status: COMPLETED | OUTPATIENT
Start: 2021-04-30 | End: 2021-04-30

## 2021-04-30 RX ORDER — PHENYLEPHRINE HCL 2.5 %
1 DROPS OPHTHALMIC (EYE)
Status: COMPLETED | OUTPATIENT
Start: 2021-04-30 | End: 2021-04-30

## 2021-04-30 RX ORDER — PROPARACAINE HYDROCHLORIDE 5 MG/ML
SOLUTION/ DROPS OPHTHALMIC AS NEEDED
Status: DISCONTINUED | OUTPATIENT
Start: 2021-04-30 | End: 2021-04-30 | Stop reason: HOSPADM

## 2021-04-30 RX ORDER — TETRACAINE HYDROCHLORIDE 5 MG/ML
1 SOLUTION OPHTHALMIC
Status: COMPLETED | OUTPATIENT
Start: 2021-04-30 | End: 2021-04-30

## 2021-04-30 RX ORDER — TETRACAINE HYDROCHLORIDE 5 MG/ML
SOLUTION OPHTHALMIC AS NEEDED
Status: DISCONTINUED | OUTPATIENT
Start: 2021-04-30 | End: 2021-04-30 | Stop reason: HOSPADM

## 2021-04-30 RX ORDER — MOXIFLOXACIN 5 MG/ML
SOLUTION/ DROPS OPHTHALMIC AS NEEDED
Status: DISCONTINUED | OUTPATIENT
Start: 2021-04-30 | End: 2021-04-30 | Stop reason: HOSPADM

## 2021-04-30 RX ORDER — BRIMONIDINE TARTRATE 0.15 %
DROPS OPHTHALMIC (EYE) AS NEEDED
Status: DISCONTINUED | OUTPATIENT
Start: 2021-04-30 | End: 2021-04-30 | Stop reason: HOSPADM

## 2021-04-30 RX ADMIN — CYCLOPENTOLATE HYDROCHLORIDE 1 DROP: 20 SOLUTION/ DROPS OPHTHALMIC at 10:16

## 2021-04-30 RX ADMIN — CYCLOPENTOLATE HYDROCHLORIDE 1 DROP: 20 SOLUTION/ DROPS OPHTHALMIC at 09:52

## 2021-04-30 RX ADMIN — CYCLOPENTOLATE HYDROCHLORIDE 1 DROP: 20 SOLUTION/ DROPS OPHTHALMIC at 10:02

## 2021-04-30 RX ADMIN — PHENYLEPHRINE HYDROCHLORIDE 1 DROP: 25 SOLUTION/ DROPS OPHTHALMIC at 10:02

## 2021-04-30 RX ADMIN — SODIUM CHLORIDE, PRESERVATIVE FREE 10 ML: 5 INJECTION INTRAVENOUS at 10:59

## 2021-04-30 RX ADMIN — TETRACAINE HYDROCHLORIDE 1 DROP: 5 SOLUTION OPHTHALMIC at 09:52

## 2021-04-30 RX ADMIN — SODIUM CHLORIDE, PRESERVATIVE FREE 10 ML: 5 INJECTION INTRAVENOUS at 10:02

## 2021-04-30 RX ADMIN — PHENYLEPHRINE HYDROCHLORIDE 1 DROP: 25 SOLUTION/ DROPS OPHTHALMIC at 09:53

## 2021-04-30 RX ADMIN — FENTANYL CITRATE 50 MCG: 50 INJECTION INTRAMUSCULAR; INTRAVENOUS at 10:59

## 2021-04-30 RX ADMIN — PHENYLEPHRINE HYDROCHLORIDE 1 DROP: 25 SOLUTION/ DROPS OPHTHALMIC at 10:16

## 2021-04-30 RX ADMIN — MIDAZOLAM HYDROCHLORIDE 1 MG: 2 INJECTION, SOLUTION INTRAMUSCULAR; INTRAVENOUS at 10:59

## 2021-04-30 RX ADMIN — TETRACAINE HYDROCHLORIDE 1 DROP: 5 SOLUTION OPHTHALMIC at 10:16

## 2021-04-30 NOTE — ANESTHESIA POSTPROCEDURE EVALUATION
Patient: Carlota Rodriguez    Procedure Summary     Date: 04/30/21 Room / Location: [facility] OR 06 / [facility] OR    Anesthesia Start: 1057 Anesthesia Stop: 1108    Procedure: REMOVE CATARACT AND IMPLANT  INTRAOCULAR LENS (Right Eye) Diagnosis:       Age-related nuclear cataract of right eye      (Age-related nuclear cataract of right eye [H25.11])    Surgeons: Michael Bonilla MD Provider: Tawnya Donaldson CRNA    Anesthesia Type: MAC ASA Status: 3          Anesthesia Type: MAC    Vitals  No vitals data found for the desired time range.          Post Anesthesia Care and Evaluation    Patient location during evaluation: bedside  Patient participation: complete - patient participated  Level of consciousness: awake and awake and alert  Pain score: 0  Pain management: adequate  Airway patency: patent  Anesthetic complications: No anesthetic complications  PONV Status: none  Cardiovascular status: acceptable and stable  Respiratory status: acceptable, room air and spontaneous ventilation  Hydration status: acceptable    Comments: BP:  121/60  HR:  87  SAT:  98  RR:  20  TEMP: 97.7

## 2021-04-30 NOTE — ANESTHESIA PREPROCEDURE EVALUATION
Anesthesia Evaluation     Patient summary reviewed and Nursing notes reviewed   no history of anesthetic complications:  NPO Solid Status: > 8 hours  NPO Liquid Status: > 8 hours           Airway   Mallampati: III  TM distance: >3 FB  Neck ROM: full  Possible difficult intubation and Small opening  Dental    (+) lower dentures and upper dentures    Pulmonary     breath sounds clear to auscultation  (+) pneumonia resolved , asthma,  (-) sleep apnea, rhonchi, decreased breath sounds, wheezes, not a smoker  Cardiovascular   Exercise tolerance: good (4-7 METS)    ECG reviewed  Rhythm: regular  Rate: normal    (+) hypertension well controlled less than 2 medications, valvular problems/murmurs MR and TI, CHF Diastolic >=55%, murmur,   (-) pacemaker, past MI, CAD, dysrhythmias, cardiac stents, DVT, hyperlipidemia    ROS comment: Right heart cath 10/2/2018:  · 1. Normal left-sided filling pressures.  · 2. Normal right-sided filling pressures.  · 3. Normal pulmonary artery pressures  RIGHT HEART CATHETERIZATION  PROCEDURE(s) PERFORMED:   1. Right heart catheterization.  2. CO by Thermodilution    TTE 8/27/2018:  · Left ventricular systolic function is normal. Estimated EF = 63%.  · Left ventricular diastolic dysfunction (grade I) consistent with impaired relaxation.  · Mild mitral valve regurgitation is present  · Mild tricuspid valve regurgitation is present    Neuro/Psych  (+) psychiatric history Depression and Anxiety,     (-) seizures, TIA, CVA  GI/Hepatic/Renal/Endo    (+) obesity,     (-) morbid obesity, no renal disease, diabetes    Musculoskeletal     Abdominal   (+) obese,    Substance History      OB/GYN negative ob/gyn ROS         Other   arthritis,      ROS/Med Hx Other: EF=63%    Hx of asthma- uses albuterol inhaler less than once a month.                   Anesthesia Plan    ASA 3     MAC     intravenous induction     Anesthetic plan, all risks, benefits, and alternatives have been provided, discussed and  informed consent has been obtained with: patient and spouse/significant other.    Plan discussed with CRNA.

## 2021-05-04 ENCOUNTER — LAB (OUTPATIENT)
Dept: LAB | Facility: HOSPITAL | Age: 61
End: 2021-05-04

## 2021-05-04 DIAGNOSIS — Z01.818 PREOP TESTING: Primary | ICD-10-CM

## 2021-05-04 LAB — SARS-COV-2 N GENE RESP QL NAA+PROBE: NOT DETECTED

## 2021-05-04 PROCEDURE — C9803 HOPD COVID-19 SPEC COLLECT: HCPCS

## 2021-05-04 PROCEDURE — 87635 SARS-COV-2 COVID-19 AMP PRB: CPT

## 2021-05-06 ENCOUNTER — ANESTHESIA EVENT (OUTPATIENT)
Dept: PERIOP | Facility: HOSPITAL | Age: 61
End: 2021-05-06

## 2021-05-07 ENCOUNTER — HOSPITAL ENCOUNTER (OUTPATIENT)
Facility: HOSPITAL | Age: 61
Setting detail: HOSPITAL OUTPATIENT SURGERY
Discharge: HOME | End: 2021-05-07
Attending: OPHTHALMOLOGY | Admitting: OPHTHALMOLOGY

## 2021-05-07 ENCOUNTER — ANESTHESIA (OUTPATIENT)
Dept: PERIOP | Facility: HOSPITAL | Age: 61
End: 2021-05-07

## 2021-05-07 VITALS
DIASTOLIC BLOOD PRESSURE: 66 MMHG | SYSTOLIC BLOOD PRESSURE: 134 MMHG | WEIGHT: 209.44 LBS | HEART RATE: 89 BPM | RESPIRATION RATE: 18 BRPM | HEIGHT: 64 IN | OXYGEN SATURATION: 97 % | BODY MASS INDEX: 35.76 KG/M2 | TEMPERATURE: 97.6 F

## 2021-05-07 PROCEDURE — V2632 POST CHMBR INTRAOCULAR LENS: HCPCS | Performed by: OPHTHALMOLOGY

## 2021-05-07 PROCEDURE — 25010000002 EPINEPHRINE PER 0.1 MG: Performed by: OPHTHALMOLOGY

## 2021-05-07 PROCEDURE — 25010000002 FENTANYL CITRATE (PF) 100 MCG/2ML SOLUTION: Performed by: NURSE ANESTHETIST, CERTIFIED REGISTERED

## 2021-05-07 PROCEDURE — 25010000002 VANCOMYCIN 1 G RECONSTITUTED SOLUTION 1 EACH VIAL: Performed by: OPHTHALMOLOGY

## 2021-05-07 PROCEDURE — 25010000002 MIDAZOLAM PER 1 MG: Performed by: NURSE ANESTHETIST, CERTIFIED REGISTERED

## 2021-05-07 DEVICE — LENS ACRYSOF IQ 6X13MM SN60WF 21.0: Type: IMPLANTABLE DEVICE | Site: EYE | Status: FUNCTIONAL

## 2021-05-07 RX ORDER — CYCLOPENTOLATE HYDROCHLORIDE 20 MG/ML
1 SOLUTION/ DROPS OPHTHALMIC
Status: COMPLETED | OUTPATIENT
Start: 2021-05-07 | End: 2021-05-07

## 2021-05-07 RX ORDER — FENTANYL CITRATE 50 UG/ML
INJECTION, SOLUTION INTRAMUSCULAR; INTRAVENOUS AS NEEDED
Status: DISCONTINUED | OUTPATIENT
Start: 2021-05-07 | End: 2021-05-07 | Stop reason: SURG

## 2021-05-07 RX ORDER — MOXIFLOXACIN 5 MG/ML
SOLUTION/ DROPS OPHTHALMIC AS NEEDED
Status: DISCONTINUED | OUTPATIENT
Start: 2021-05-07 | End: 2021-05-07 | Stop reason: HOSPADM

## 2021-05-07 RX ORDER — TETRACAINE HYDROCHLORIDE 5 MG/ML
1 SOLUTION OPHTHALMIC AS NEEDED
Status: COMPLETED | OUTPATIENT
Start: 2021-05-07 | End: 2021-05-07

## 2021-05-07 RX ORDER — PHENYLEPHRINE HCL 2.5 %
1 DROPS OPHTHALMIC (EYE)
Status: COMPLETED | OUTPATIENT
Start: 2021-05-07 | End: 2021-05-07

## 2021-05-07 RX ORDER — SODIUM CHLORIDE 0.9 % (FLUSH) 0.9 %
10 SYRINGE (ML) INJECTION AS NEEDED
Status: DISCONTINUED | OUTPATIENT
Start: 2021-05-07 | End: 2021-05-07 | Stop reason: HOSPADM

## 2021-05-07 RX ORDER — MIDAZOLAM HYDROCHLORIDE 1 MG/ML
INJECTION INTRAMUSCULAR; INTRAVENOUS AS NEEDED
Status: DISCONTINUED | OUTPATIENT
Start: 2021-05-07 | End: 2021-05-07 | Stop reason: SURG

## 2021-05-07 RX ORDER — BRIMONIDINE TARTRATE 0.15 %
DROPS OPHTHALMIC (EYE) AS NEEDED
Status: DISCONTINUED | OUTPATIENT
Start: 2021-05-07 | End: 2021-05-07 | Stop reason: HOSPADM

## 2021-05-07 RX ORDER — PREDNISOLONE ACETATE 10 MG/ML
SUSPENSION/ DROPS OPHTHALMIC AS NEEDED
Status: DISCONTINUED | OUTPATIENT
Start: 2021-05-07 | End: 2021-05-07 | Stop reason: HOSPADM

## 2021-05-07 RX ORDER — TETRACAINE HYDROCHLORIDE 5 MG/ML
SOLUTION OPHTHALMIC AS NEEDED
Status: DISCONTINUED | OUTPATIENT
Start: 2021-05-07 | End: 2021-05-07 | Stop reason: HOSPADM

## 2021-05-07 RX ADMIN — SODIUM CHLORIDE, PRESERVATIVE FREE 10 ML: 5 INJECTION INTRAVENOUS at 06:07

## 2021-05-07 RX ADMIN — FENTANYL CITRATE 50 MCG: 50 INJECTION INTRAMUSCULAR; INTRAVENOUS at 07:56

## 2021-05-07 RX ADMIN — CYCLOPENTOLATE HYDROCHLORIDE 1 DROP: 20 SOLUTION/ DROPS OPHTHALMIC at 06:19

## 2021-05-07 RX ADMIN — FENTANYL CITRATE 50 MCG: 50 INJECTION INTRAMUSCULAR; INTRAVENOUS at 07:49

## 2021-05-07 RX ADMIN — CYCLOPENTOLATE HYDROCHLORIDE 1 DROP: 20 SOLUTION/ DROPS OPHTHALMIC at 05:59

## 2021-05-07 RX ADMIN — TETRACAINE HYDROCHLORIDE 1 DROP: 5 SOLUTION OPHTHALMIC at 06:19

## 2021-05-07 RX ADMIN — PHENYLEPHRINE HYDROCHLORIDE 1 DROP: 25 SOLUTION/ DROPS OPHTHALMIC at 06:09

## 2021-05-07 RX ADMIN — MIDAZOLAM HYDROCHLORIDE 2 MG: 2 INJECTION, SOLUTION INTRAMUSCULAR; INTRAVENOUS at 07:42

## 2021-05-07 RX ADMIN — CYCLOPENTOLATE HYDROCHLORIDE 1 DROP: 20 SOLUTION/ DROPS OPHTHALMIC at 06:09

## 2021-05-07 RX ADMIN — PHENYLEPHRINE HYDROCHLORIDE 1 DROP: 25 SOLUTION/ DROPS OPHTHALMIC at 05:59

## 2021-05-07 RX ADMIN — PHENYLEPHRINE HYDROCHLORIDE 1 DROP: 25 SOLUTION/ DROPS OPHTHALMIC at 06:19

## 2021-05-07 RX ADMIN — TETRACAINE HYDROCHLORIDE 1 DROP: 5 SOLUTION OPHTHALMIC at 05:59

## 2021-05-07 NOTE — ANESTHESIA POSTPROCEDURE EVALUATION
Patient: Carlota Rodriguez    Procedure Summary     Date: 05/07/21 Room / Location: [facility] OR 06 / [facility] OR    Anesthesia Start: 0744 Anesthesia Stop: 0759    Procedure: REMOVE CATARACT AND IMPLANT  INTRAOCULAR LENS (Left Eye) Diagnosis:       Age-related nuclear cataract of left eye      (Age-related nuclear cataract of left eye [H25.12])    Surgeons: Michael Bonilla MD Provider: Tigre Santiago MD    Anesthesia Type: MAC ASA Status: 3          Anesthesia Type: MAC    Vitals  No vitals data found for the desired time range.          Post Anesthesia Care and Evaluation    Patient participation: complete - patient participated  Level of consciousness: awake and alert  Pain score: 0  Pain management: adequate  Airway patency: patent  Anesthetic complications: No anesthetic complications  PONV Status: none  Cardiovascular status: hemodynamically stable  Respiratory status: spontaneous ventilation and room air  Hydration status: acceptable

## 2021-05-07 NOTE — ANESTHESIA PREPROCEDURE EVALUATION
Anesthesia Evaluation     Patient summary reviewed and Nursing notes reviewed   no history of anesthetic complications:  NPO Solid Status: > 8 hours  NPO Liquid Status: > 8 hours           Airway   Mallampati: III  TM distance: >3 FB  Neck ROM: full  Possible difficult intubation and Small opening  Dental    (+) lower dentures and upper dentures    Pulmonary     breath sounds clear to auscultation  (+) pneumonia resolved , asthma,  (-) sleep apnea, rhonchi, decreased breath sounds, wheezes, not a smoker  Cardiovascular   Exercise tolerance: good (4-7 METS)    ECG reviewed  Rhythm: regular  Rate: normal    (+) hypertension well controlled less than 2 medications, valvular problems/murmurs MR and TI, CHF Diastolic >=55%, murmur,   (-) pacemaker, past MI, CAD, dysrhythmias, cardiac stents, DVT, hyperlipidemia    ROS comment: Right heart cath 10/2/2018:  · 1. Normal left-sided filling pressures.  · 2. Normal right-sided filling pressures.  · 3. Normal pulmonary artery pressures  RIGHT HEART CATHETERIZATION  PROCEDURE(s) PERFORMED:   1. Right heart catheterization.  2. CO by Thermodilution    TTE 8/27/2018:  · Left ventricular systolic function is normal. Estimated EF = 63%.  · Left ventricular diastolic dysfunction (grade I) consistent with impaired relaxation.  · Mild mitral valve regurgitation is present  · Mild tricuspid valve regurgitation is present    Neuro/Psych  (+) psychiatric history Depression and Anxiety,     (-) seizures, TIA, CVA  GI/Hepatic/Renal/Endo    (+) obesity,     (-) morbid obesity, no renal disease, diabetes    Musculoskeletal     Abdominal   (+) obese,    Substance History      OB/GYN negative ob/gyn ROS         Other   arthritis,      ROS/Med Hx Other: EF=63%    Hx of asthma- uses albuterol inhaler less than once a month.                     Anesthesia Plan    ASA 3     MAC     intravenous induction     Anesthetic plan, all risks, benefits, and alternatives have been provided, discussed and  informed consent has been obtained with: patient and spouse/significant other.

## 2021-05-16 DIAGNOSIS — I51.89 DIASTOLIC DYSFUNCTION: ICD-10-CM

## 2021-05-17 RX ORDER — LISINOPRIL AND HYDROCHLOROTHIAZIDE 12.5; 1 MG/1; MG/1
TABLET ORAL
Qty: 90 TABLET | Refills: 3 | Status: SHIPPED | OUTPATIENT
Start: 2021-05-17

## 2022-05-31 ENCOUNTER — TELEPHONE (OUTPATIENT)
Dept: PODIATRY | Facility: CLINIC | Age: 62
End: 2022-05-31

## 2022-05-31 NOTE — TELEPHONE ENCOUNTER
PATIENT IS HAVING PAIN IN HER ANKLE, SHE HAD SURGERY ON IT 3-4 YEARS AGO IN [place].  WILL DR GONZALEZ SEE THIS OR DOES SHE NEED TO GO BACK TO PROVIDER THAT DID SURGERY

## 2022-05-31 NOTE — TELEPHONE ENCOUNTER
TRIED CALLING PATIENT TO LET HER KNOW DR GONZALEZ WILL SEE HER BUT NO ONE ANSWERED AND PER AUTOMATED SYSTEM NO VOICEMAIL

## 2022-06-02 ENCOUNTER — TRANSCRIBE ORDERS (OUTPATIENT)
Dept: PODIATRY | Facility: CLINIC | Age: 62
End: 2022-06-02

## 2022-06-02 DIAGNOSIS — Z98.890 HISTORY OF ANKLE SURGERY: ICD-10-CM

## 2022-06-02 DIAGNOSIS — M25.571 RIGHT ANKLE PAIN, UNSPECIFIED CHRONICITY: Primary | ICD-10-CM

## 2022-06-10 PROBLEM — Z86.19 HISTORY OF HEPATITIS A VIRUS INFECTION: Status: ACTIVE | Noted: 2022-06-10

## 2022-06-10 PROBLEM — M19.90 OSTEOARTHRITIS: Status: ACTIVE | Noted: 2022-06-10

## 2022-06-10 PROBLEM — I10 PRIMARY HYPERTENSION: Status: ACTIVE | Noted: 2021-10-01

## 2022-06-10 PROBLEM — M54.50 LOW BACK PAIN: Status: ACTIVE | Noted: 2022-06-10

## 2022-06-10 PROBLEM — S92.514D CLOSED NONDISPLACED FRACTURE OF PROXIMAL PHALANX OF LESSER TOE OF RIGHT FOOT WITH ROUTINE HEALING: Status: ACTIVE | Noted: 2021-11-10

## 2022-06-10 PROBLEM — F32.A DEPRESSION: Status: ACTIVE | Noted: 2022-06-10

## (undated) DEVICE — STERILE POLYISOPRENE POWDER-FREE SURGICAL GLOVES WITH EMOLLIENT COATING: Brand: PROTEXIS

## (undated) DEVICE — SOL IRR H2O BTL 1000ML STRL

## (undated) DEVICE — CATH THERMODIL SWANGANZ 4LUM 6F 110CM

## (undated) DEVICE — Device

## (undated) DEVICE — ELECTRODE,RT,STRESS,FOAM,50PK: Brand: MEDLINE

## (undated) DEVICE — GLV SURG SENSICARE POLYISPRN W/ALOE PF LF 6.5 GRN STRL

## (undated) DEVICE — PK CATH LAB 60

## (undated) DEVICE — GLV SURG SENSICARE MICRO PF LF 7.5 STRL

## (undated) DEVICE — GLIDESHEATH BASIC HYDROPHILIC COATED INTRODUCER SHEATH: Brand: GLIDESHEATH